# Patient Record
Sex: MALE | Race: WHITE | NOT HISPANIC OR LATINO | Employment: FULL TIME | URBAN - NONMETROPOLITAN AREA
[De-identification: names, ages, dates, MRNs, and addresses within clinical notes are randomized per-mention and may not be internally consistent; named-entity substitution may affect disease eponyms.]

---

## 2022-09-30 ENCOUNTER — APPOINTMENT (OUTPATIENT)
Dept: LAB | Facility: CLINIC | Age: 77
End: 2022-09-30
Payer: COMMERCIAL

## 2022-09-30 DIAGNOSIS — E78.5 HYPERLIPIDEMIA, UNSPECIFIED HYPERLIPIDEMIA TYPE: ICD-10-CM

## 2022-09-30 DIAGNOSIS — E13.69 OTHER SPECIFIED DIABETES MELLITUS WITH OTHER SPECIFIED COMPLICATION, UNSPECIFIED WHETHER LONG TERM INSULIN USE (HCC): ICD-10-CM

## 2022-09-30 DIAGNOSIS — E03.9 MYXEDEMA HEART DISEASE: ICD-10-CM

## 2022-09-30 DIAGNOSIS — N13.8 ENLARGED PROSTATE WITH URINARY OBSTRUCTION: ICD-10-CM

## 2022-09-30 DIAGNOSIS — I51.9 MYXEDEMA HEART DISEASE: ICD-10-CM

## 2022-09-30 DIAGNOSIS — N40.0 BENIGN PROSTATIC HYPERPLASIA, UNSPECIFIED WHETHER LOWER URINARY TRACT SYMPTOMS PRESENT: ICD-10-CM

## 2022-09-30 DIAGNOSIS — N40.1 ENLARGED PROSTATE WITH URINARY OBSTRUCTION: ICD-10-CM

## 2022-09-30 LAB
ANION GAP SERPL CALCULATED.3IONS-SCNC: 2 MMOL/L (ref 4–13)
BASOPHILS # BLD AUTO: 0.04 THOUSANDS/ΜL (ref 0–0.1)
BASOPHILS NFR BLD AUTO: 1 % (ref 0–1)
BUN SERPL-MCNC: 24 MG/DL (ref 5–25)
CALCIUM SERPL-MCNC: 9.5 MG/DL (ref 8.3–10.1)
CHLORIDE SERPL-SCNC: 108 MMOL/L (ref 96–108)
CO2 SERPL-SCNC: 29 MMOL/L (ref 21–32)
CREAT SERPL-MCNC: 0.93 MG/DL (ref 0.6–1.3)
EOSINOPHIL # BLD AUTO: 0.15 THOUSAND/ΜL (ref 0–0.61)
EOSINOPHIL NFR BLD AUTO: 4 % (ref 0–6)
ERYTHROCYTE [DISTWIDTH] IN BLOOD BY AUTOMATED COUNT: 18.2 % (ref 11.6–15.1)
GFR SERPL CREATININE-BSD FRML MDRD: 80 ML/MIN/1.73SQ M
GLUCOSE P FAST SERPL-MCNC: 92 MG/DL (ref 65–99)
HCT VFR BLD AUTO: 42.4 % (ref 36.5–49.3)
HGB BLD-MCNC: 13.2 G/DL (ref 12–17)
IMM GRANULOCYTES # BLD AUTO: 0.01 THOUSAND/UL (ref 0–0.2)
IMM GRANULOCYTES NFR BLD AUTO: 0 % (ref 0–2)
LYMPHOCYTES # BLD AUTO: 0.95 THOUSANDS/ΜL (ref 0.6–4.47)
LYMPHOCYTES NFR BLD AUTO: 24 % (ref 14–44)
MCH RBC QN AUTO: 27.1 PG (ref 26.8–34.3)
MCHC RBC AUTO-ENTMCNC: 31.1 G/DL (ref 31.4–37.4)
MCV RBC AUTO: 87 FL (ref 82–98)
MONOCYTES # BLD AUTO: 0.45 THOUSAND/ΜL (ref 0.17–1.22)
MONOCYTES NFR BLD AUTO: 11 % (ref 4–12)
NEUTROPHILS # BLD AUTO: 2.43 THOUSANDS/ΜL (ref 1.85–7.62)
NEUTS SEG NFR BLD AUTO: 60 % (ref 43–75)
NRBC BLD AUTO-RTO: 0 /100 WBCS
PLATELET # BLD AUTO: 221 THOUSANDS/UL (ref 149–390)
PMV BLD AUTO: 10.7 FL (ref 8.9–12.7)
POTASSIUM SERPL-SCNC: 4.4 MMOL/L (ref 3.5–5.3)
PSA SERPL-MCNC: <0.1 NG/ML (ref 0–4)
RBC # BLD AUTO: 4.87 MILLION/UL (ref 3.88–5.62)
SODIUM SERPL-SCNC: 139 MMOL/L (ref 135–147)
TSH SERPL DL<=0.05 MIU/L-ACNC: 2.59 UIU/ML (ref 0.45–4.5)
WBC # BLD AUTO: 4.03 THOUSAND/UL (ref 4.31–10.16)

## 2022-09-30 PROCEDURE — 85025 COMPLETE CBC W/AUTO DIFF WBC: CPT

## 2022-09-30 PROCEDURE — 80048 BASIC METABOLIC PNL TOTAL CA: CPT

## 2022-09-30 PROCEDURE — G0103 PSA SCREENING: HCPCS

## 2022-09-30 PROCEDURE — 36415 COLL VENOUS BLD VENIPUNCTURE: CPT

## 2022-09-30 PROCEDURE — 84443 ASSAY THYROID STIM HORMONE: CPT

## 2022-12-08 PROBLEM — E78.5 HYPERLIPIDEMIA: Status: ACTIVE | Noted: 2021-09-02

## 2022-12-08 PROBLEM — Z85.09 HISTORY OF GASTROINTESTINAL STROMAL TUMOR (GIST): Status: ACTIVE | Noted: 2021-09-02

## 2022-12-08 PROBLEM — E03.9 HYPOTHYROIDISM: Status: ACTIVE | Noted: 2021-09-02

## 2022-12-08 PROBLEM — K56.600 PARTIAL SMALL BOWEL OBSTRUCTION (HCC): Status: ACTIVE | Noted: 2021-09-02

## 2022-12-08 PROBLEM — C61 PROSTATE CANCER (HCC): Status: ACTIVE | Noted: 2022-01-07

## 2022-12-08 PROBLEM — E04.2 MULTINODULAR GOITER: Status: ACTIVE | Noted: 2022-12-08

## 2022-12-09 ENCOUNTER — OFFICE VISIT (OUTPATIENT)
Dept: FAMILY MEDICINE CLINIC | Facility: CLINIC | Age: 77
End: 2022-12-09

## 2022-12-09 VITALS
HEIGHT: 67 IN | OXYGEN SATURATION: 97 % | WEIGHT: 145.4 LBS | TEMPERATURE: 96.5 F | BODY MASS INDEX: 22.82 KG/M2 | SYSTOLIC BLOOD PRESSURE: 122 MMHG | DIASTOLIC BLOOD PRESSURE: 76 MMHG | HEART RATE: 60 BPM

## 2022-12-09 DIAGNOSIS — Z23 ENCOUNTER FOR VACCINATION: ICD-10-CM

## 2022-12-09 DIAGNOSIS — K59.09 CHRONIC CONSTIPATION: ICD-10-CM

## 2022-12-09 DIAGNOSIS — C49.A0 MALIGNANT GASTROINTESTINAL STROMAL TUMOR, UNSPECIFIED SITE (HCC): Primary | ICD-10-CM

## 2022-12-09 RX ORDER — UBIDECARENONE 100 MG
100 CAPSULE ORAL DAILY
COMMUNITY

## 2022-12-09 RX ORDER — ATORVASTATIN CALCIUM 10 MG/1
10 TABLET, FILM COATED ORAL DAILY
COMMUNITY
Start: 2022-12-02

## 2022-12-09 RX ORDER — BIOTIN 1 MG
2500 TABLET ORAL 3 TIMES WEEKLY
COMMUNITY

## 2022-12-09 RX ORDER — TADALAFIL 5 MG/1
5 TABLET ORAL DAILY
COMMUNITY
Start: 2022-08-09

## 2022-12-09 RX ORDER — ICOSAPENT ETHYL 1000 MG/1
1 CAPSULE ORAL 2 TIMES DAILY
COMMUNITY

## 2022-12-09 RX ORDER — LEVOTHYROXINE SODIUM 0.05 MG/1
50 TABLET ORAL DAILY
COMMUNITY
Start: 2022-12-02

## 2022-12-09 RX ORDER — TADALAFIL 20 MG/1
20 TABLET ORAL WEEKLY
COMMUNITY
Start: 2022-08-15

## 2022-12-09 RX ORDER — LANSOPRAZOLE 15 MG/1
15 CAPSULE, DELAYED RELEASE ORAL DAILY
COMMUNITY
Start: 2022-11-28

## 2022-12-09 NOTE — PROGRESS NOTES
Assessment/Plan:    78year old male with PMH of gastrointestinal stromal tumor s/p  partial gastrectomy (2008), HTN, HLD, episodes of partial SBO, and prostate cancer s/p prostatectomy (1/10/1763) complicated by SBO s/p small bowel resection and reanastomosis, DVT of the right proximal and mid posterior tibialis veins (5/2022) on Xarelto  Patient with c/o intermittent episodes of constipation, only has BM every 3-4x since his operation on 3/23/22 typically will resolve with senna + milk of magnesia  Recent labs reviewed 9/30/22 showed TSH, CBC and BMP wnl  PSA total also wnl  Patient encouraged to continue follow up with Urology  He is requesting referral to GI which was placed today  No problem-specific Assessment & Plan notes found for this encounter  Diagnoses and all orders for this visit:    History of Malignant gastrointestinal stromal tumor s/p gastrectomy  -     Ambulatory Referral to Gastroenterology; Future    Encounter for vaccination  -     influenza vaccine, high-dose, PF 0 7 mL (FLUZONE HIGH-DOSE)    Chronic constipation  -  See a/p as above      - Ambulatory Referral to Gastroenterology; Future      Subjective:      Patient ID: Debra Barlow is a 78 y o  male  78year old male with PMH of gastrointestinal stromal tumor s/p  partial gastrectomy (2008), HTN, HLD, episodes of partial SBO, and prostate cancer s/p prostatectomy (3/23/2022), DVT of the right proximal and mid posterior tibialis veins (5/2022) on Xarelto  Of note, patient's prostatectomy and pelvic lymph node dissection was complicated by small bowel injury  Then unfortunately, post op surgical course was complicated by high grade SBO and patient had to undergo open lysis of adhesions with small bowel resection and reanastomosis    Patient reports that since his surgery he has been experiencing intermittent episodes of constipation, only has BM every 3-4x  Patient reports he take senna but sometimes doesn't experience relief of constipation and subsequently takes milk of magnesia which will typically resolves his constipation  Patient also reports intermittent abdominal "twinges" not true pain on his right lower quadrant  Patient reports he started eating soft foods at that time, prune juice every morning  Oatmeal fruits for breakfast, vegetables and lean protein (ie fish) for dinner and very light dinner typically around 3:30/4pm  Patient denies any BRBPR, melanotic stools, dysphagia with liquids  He does, however, not that if he eats thick solids he will develop a sensation of "food being stuck in there"  Patient baseline weight he reports is about 152-154lbs, currently at 145 lbs so does have some weight loss  He is active, however, and notes that he walks on his treadmill everyday        The following portions of the patient's history were reviewed and updated as appropriate: allergies, current medications, past family history, past medical history, past social history, past surgical history and problem list     Review of Systems   Constitutional: Negative for chills and fever  Respiratory: Negative for chest tightness and shortness of breath  Gastrointestinal: Positive for constipation  Negative for abdominal distention, abdominal pain, blood in stool, nausea and vomiting  Objective:      /76   Pulse 60   Temp (!) 96 5 °F (35 8 °C) (Tympanic)   Ht 5' 6 5" (1 689 m)   Wt 66 kg (145 lb 6 4 oz)   SpO2 97%   BMI 23 12 kg/m²          Physical Exam  Constitutional:       Appearance: He is well-developed  HENT:      Head: Normocephalic and atraumatic  Nose: Nose normal    Eyes:      Conjunctiva/sclera: Conjunctivae normal    Cardiovascular:      Rate and Rhythm: Normal rate and regular rhythm  Heart sounds: Normal heart sounds  Pulmonary:      Effort: Pulmonary effort is normal       Breath sounds: Normal breath sounds     Abdominal:      General: Bowel sounds are normal       Palpations: Abdomen is soft    Musculoskeletal:        Arms:       Cervical back: Normal range of motion and neck supple  Right lower leg: No edema  Left lower leg: No edema  Comments: 3cm x 3cm lipoma on medial portion of deltoid   Skin:     General: Skin is warm and dry  Neurological:      Mental Status: He is alert and oriented to person, place, and time

## 2022-12-28 DIAGNOSIS — K21.9 GASTROESOPHAGEAL REFLUX DISEASE WITHOUT ESOPHAGITIS: Primary | ICD-10-CM

## 2022-12-29 RX ORDER — LANSOPRAZOLE 15 MG/1
15 CAPSULE, DELAYED RELEASE ORAL DAILY
Qty: 30 CAPSULE | Refills: 1 | Status: SHIPPED | OUTPATIENT
Start: 2022-12-29 | End: 2023-01-05 | Stop reason: CLARIF

## 2023-01-03 ENCOUNTER — TELEPHONE (OUTPATIENT)
Dept: FAMILY MEDICINE CLINIC | Facility: CLINIC | Age: 78
End: 2023-01-03

## 2023-01-03 NOTE — TELEPHONE ENCOUNTER
Called patient and left general VM in regards to medication refill of Prevacid  Coverage has changed and patient will have to pay out of pocket for medication or Dr Sreekanth Sesay would have to speak with patient about switching medication  Asked to give patient a call back to the office

## 2023-01-05 DIAGNOSIS — K21.9 GASTROESOPHAGEAL REFLUX DISEASE WITHOUT ESOPHAGITIS: ICD-10-CM

## 2023-01-05 RX ORDER — OMEPRAZOLE 10 MG/1
10 CAPSULE, DELAYED RELEASE ORAL DAILY
Qty: 90 CAPSULE | Refills: 1 | Status: SHIPPED | OUTPATIENT
Start: 2023-01-05

## 2023-01-05 RX ORDER — LANSOPRAZOLE 15 MG/1
10 CAPSULE, DELAYED RELEASE ORAL DAILY
COMMUNITY
End: 2023-01-05 | Stop reason: CLARIF

## 2023-01-05 NOTE — PROGRESS NOTES
PCP discussed with patient that insurance no longer covering lansoprazole, hence he is amenable to changing PPI to covered alternative  Will send over Prilosec 10mg at this time  Patient to call back with any questions or concerns

## 2023-01-06 ENCOUNTER — RA CDI HCC (OUTPATIENT)
Dept: OTHER | Facility: HOSPITAL | Age: 78
End: 2023-01-06

## 2023-01-06 NOTE — PROGRESS NOTES
[facility]  coding opportunities       Chart reviewed, no opportunity found:   [address]        Patients Insurance     Medicare Insurance: [employer]

## 2023-01-12 ENCOUNTER — TELEPHONE (OUTPATIENT)
Dept: FAMILY MEDICINE CLINIC | Facility: CLINIC | Age: 78
End: 2023-01-12

## 2023-01-12 NOTE — TELEPHONE ENCOUNTER
Anali Beatty called to reschedule his AWV to next week due to transportation issues  His appointment has been rescheduled to 1/19 with Dr Serena Diaz  I did mention that his Prevacid medication is not covered under his new insurance  I informed him that  wanted to review other medication options that could be covered under his insurance  He agreed to cover this concern during his upcoming appointment  Lastly, I mentioned that we have received conflicting dates for the medical records requests we'd like to send to [facility] and [facility]  I asked if he could kindly review his information and provide the accurate dates during his upcoming appointment

## 2023-01-19 ENCOUNTER — APPOINTMENT (OUTPATIENT)
Dept: LAB | Facility: CLINIC | Age: 78
End: 2023-01-19

## 2023-01-19 ENCOUNTER — OFFICE VISIT (OUTPATIENT)
Dept: FAMILY MEDICINE CLINIC | Facility: CLINIC | Age: 78
End: 2023-01-19

## 2023-01-19 ENCOUNTER — TELEPHONE (OUTPATIENT)
Dept: FAMILY MEDICINE CLINIC | Facility: CLINIC | Age: 78
End: 2023-01-19

## 2023-01-19 VITALS
WEIGHT: 148.6 LBS | BODY MASS INDEX: 23.32 KG/M2 | DIASTOLIC BLOOD PRESSURE: 70 MMHG | OXYGEN SATURATION: 100 % | HEIGHT: 67 IN | TEMPERATURE: 96.7 F | SYSTOLIC BLOOD PRESSURE: 124 MMHG

## 2023-01-19 DIAGNOSIS — Z11.59 NEED FOR HEPATITIS C SCREENING TEST: ICD-10-CM

## 2023-01-19 DIAGNOSIS — Z23 ENCOUNTER FOR VACCINATION: ICD-10-CM

## 2023-01-19 DIAGNOSIS — K21.9 GASTROESOPHAGEAL REFLUX DISEASE WITHOUT ESOPHAGITIS: ICD-10-CM

## 2023-01-19 DIAGNOSIS — Z00.00 MEDICARE ANNUAL WELLNESS VISIT, SUBSEQUENT: Primary | ICD-10-CM

## 2023-01-19 DIAGNOSIS — E78.2 MIXED HYPERLIPIDEMIA: ICD-10-CM

## 2023-01-19 LAB — HCV AB SER QL: NORMAL

## 2023-01-19 RX ORDER — ICOSAPENT ETHYL 1000 MG/1
1 CAPSULE ORAL 2 TIMES DAILY
Qty: 90 CAPSULE | Refills: 1 | Status: SHIPPED | OUTPATIENT
Start: 2023-01-19

## 2023-01-19 RX ORDER — OMEPRAZOLE 10 MG/1
10 CAPSULE, DELAYED RELEASE ORAL DAILY
Qty: 90 CAPSULE | Refills: 1 | Status: SHIPPED | OUTPATIENT
Start: 2023-01-19

## 2023-01-19 RX ORDER — SILDENAFIL 25 MG/1
25 TABLET, FILM COATED ORAL DAILY PRN
Qty: 10 TABLET | Refills: 0 | Status: CANCELLED | OUTPATIENT
Start: 2023-01-19

## 2023-01-19 NOTE — TELEPHONE ENCOUNTER
Called patient about lab results  Left  to give the office a call       Hepatitis C lab result was normal

## 2023-01-19 NOTE — PROGRESS NOTES
Assessment and Plan:     Pt reports getting a colonoscopy done in 2020 at [address]  We are unable to locate the results  Patient is unsure if he needs a repeat colonoscopy  He states that he will be following up with [address] in 4 months and will review colonoscopy with his provider there  He has a PMH of ED and reports dose increase of Tadafil by his urologist  He reports no improvement with Tadafil  PCP and Urologist to have a consultation and discuss further pharmacotherapy  Problem List Items Addressed This Visit        Digestive    GERD (gastroesophageal reflux disease)     Medication changed to omeprazole as lansoprazole is not covered by his insurance  Relevant Medications    omeprazole (PriLOSEC) 10 mg delayed release capsule       Other    Hyperlipidemia    Relevant Medications    Icosapent Ethyl 1 g CAPS   Other Visit Diagnoses     Medicare annual wellness visit, subsequent    -  Primary    Encounter for vaccination        Relevant Orders    Pneumococcal Conjugate Vaccine 20-valent (Pcv20) (Completed)    Need for hepatitis C screening test        Relevant Orders    Hepatitis C antibody (Completed)          Depression Screening and Follow-up Plan: Patient was screened for depression during today's encounter  They screened negative with a PHQ-2 score of 0  Preventive health issues were discussed with patient, and age appropriate screening tests were ordered as noted in patient's After Visit Summary  Personalized health advice and appropriate referrals for health education or preventive services given if needed, as noted in patient's After Visit Summary  History of Present Illness:     Patient presents for a Medicare Wellness Visit    Patinet states that he is doing well overall  Reports no new concerns        Patient Care Team:  Corina Johnson MD as PCP - General (Family Medicine)     Review of Systems:     Review of Systems   Constitutional: Negative for chills and fever  HENT: Negative for ear pain and sore throat  Eyes: Negative for pain and visual disturbance  Respiratory: Negative for cough and shortness of breath  Cardiovascular: Negative for chest pain and palpitations  Gastrointestinal: Negative for abdominal pain and vomiting  Genitourinary: Negative for dysuria and hematuria  Musculoskeletal: Negative for arthralgias  Skin: Negative for color change and rash  Neurological: Negative for seizures and syncope  All other systems reviewed and are negative         Problem List:     Patient Active Problem List   Diagnosis   • GERD (gastroesophageal reflux disease)   • History of gastrointestinal stromal tumor (GIST)   • Hyperlipidemia   • Hypothyroidism   • Partial small bowel obstruction (HCC)   • Prostate cancer ([facility] [address] )   • Multinodular goiter   • Malignant gastrointestinal stromal tumor, unspecified site [facility])      Past Medical and Surgical History:     Past Medical History:   Diagnosis Date   • Allergic      Past Surgical History:   Procedure Laterality Date   • PROSTATE SURGERY  2022   • SMALL INTESTINE SURGERY N/A    • STOMACH SURGERY  1998      Family History:     Family History   Problem Relation Age of Onset   • Heart attack Mother    • Heart attack Father    • Hypertension Father    • Heart attack Sister    • Heart attack Brother       Social History:     Social History     Socioeconomic History   • Marital status: Single     Spouse name: None   • Number of children: None   • Years of education: None   • Highest education level: None   Occupational History   • None   Tobacco Use   • Smoking status: Never   • Smokeless tobacco: Never   Vaping Use   • Vaping Use: Never used   Substance and Sexual Activity   • Alcohol use: Not Currently     Alcohol/week: 1 0 standard drink     Types: 1 Glasses of wine per week     Comment: occassional   • Drug use: Never   • Sexual activity: None   Other Topics Concern   • None   Social History Narrative   • None     Social Determinants of Health     Financial Resource Strain: Low Risk    • Difficulty of Paying Living Expenses: Not hard at all   Food Insecurity: Not on file   Transportation Needs: No Transportation Needs   • Lack of Transportation (Medical): No   • Lack of Transportation (Non-Medical): No   Physical Activity: Not on file   Stress: Not on file   Social Connections: Not on file   Intimate Partner Violence: Not on file   Housing Stability: Not on file      Medications and Allergies:     Current Outpatient Medications   Medication Sig Dispense Refill   • ARTIFICIAL TEAR SOLUTION OP Apply 2 drops to eye daily     • atorvastatin (LIPITOR) 10 mg tablet Take 10 mg by mouth daily     • Biotin 1000 MCG tablet Take 2,500 mcg by mouth 3 (three) times a week     • co-enzyme Q-10 100 mg capsule Take 100 mg by mouth in the morning     • Icosapent Ethyl 1 g CAPS Take 1 capsule (1 g total) by mouth 2 (two) times a day 90 capsule 1   • levothyroxine 50 mcg tablet Take 50 mcg by mouth daily     • omeprazole (PriLOSEC) 10 mg delayed release capsule Take 1 capsule (10 mg total) by mouth daily 90 capsule 1   • tadalafil (CIALIS) 20 MG tablet Take 20 mg by mouth once a week (Patient not taking: Reported on 1/19/2023)     • tadalafil (CIALIS) 5 MG tablet Take 5 mg by mouth in the morning (Patient not taking: Reported on 1/19/2023)       No current facility-administered medications for this visit  Allergies   Allergen Reactions   • Iodine - Food Allergy Hives, Anaphylaxis and Rash     CT dye  CT dye     • Nuts - Food Allergy Itching     Tongue irritation and pruritis    Itching of lips and tongue     • Sulfa Antibiotics Hives and Rash   • Codeine Abdominal Pain and Other (See Comments)     Stomach pain     • Shellfish Allergy - Food Allergy Rash      Immunizations:     Immunization History   Administered Date(s) Administered   • COVID-19 MODERNA VACC 0 5 ML IM 04/02/2021, 04/30/2021, 11/01/2021   • Influenza, high dose seasonal 0 7 mL 12/09/2022   • Pneumococcal Conjugate Vaccine 20-valent (Pcv20), Polysace 01/19/2023      Health Maintenance:         Topic Date Due   • Colorectal Cancer Screening  Never done   • Hepatitis C Screening  Completed         Topic Date Due   • COVID-19 Vaccine (4 - Booster for Moderna series) 12/27/2021      Medicare Screening Tests and Risk Assessments:     John Wood is here for his Subsequent Wellness visit  Last Medicare Wellness visit information reviewed, patient interviewed, no change since last AWV  Health Risk Assessment:   Patient rates overall health as excellent  Patient feels that their physical health rating is same  Patient is very satisfied with their life  Eyesight was rated as same  Hearing was rated as same  Patient feels that their emotional and mental health rating is same  Patients states they are never, rarely angry  Patient states they are sometimes unusually tired/fatigued  Pain experienced in the last 7 days has been none  Patient states that he has experienced no weight loss or gain in last 6 months  Depression Screening:   PHQ-2 Score: 0      Fall Risk Screening: In the past year, patient has experienced: no history of falling in past year      Home Safety:  Patient does not have trouble with stairs inside or outside of their home  Patient has working smoke alarms and has working carbon monoxide detector  Home safety hazards include: none  Nutrition:   Current diet is No Added Salt, Regular, Low Cholesterol and Low Saturated Fat  Medications:   Patient is currently taking over-the-counter supplements  OTC medications include: see medication list  Patient is able to manage medications  Activities of Daily Living (ADLs)/Instrumental Activities of Daily Living (IADLs):   Walk and transfer into and out of bed and chair?: Yes  Dress and groom yourself?: Yes    Bathe or shower yourself?: Yes    Feed yourself?  Yes  Do your laundry/housekeeping?: Yes  Manage your money, pay your bills and track your expenses?: Yes  Make your own meals?: Yes    Do your own shopping?: Yes    Previous Hospitalizations:   Any hospitalizations or ED visits within the last 12 months?: No      Advance Care Planning:   Living will: Yes    Durable POA for healthcare: Yes    Advanced directive: Yes    Advanced directive counseling given: Yes    Five wishes given: No      Comments: Girlfriend- Dandre Apo  Code status- full code    PREVENTIVE SCREENINGS      Cardiovascular Screening:    General: Screening Not Indicated, History Lipid Disorder and Risks and Benefits Discussed      Diabetes Screening:     General: Screening Not Indicated, History Diabetes and Risks and Benefits Discussed      Colorectal Cancer Screening:     General: Risks and Benefits Discussed      Prostate Cancer Screening:    General: History Prostate Cancer and Screening Current      Abdominal Aortic Aneurysm (AAA) Screening:    Risk factors include: age between 73-69 yo        General: Screening Not Indicated      Lung Cancer Screening:     General: Screening Not Indicated      Hepatitis C Screening:    General: Risks and Benefits Discussed    Hep C Screening Accepted: Yes      Screening, Brief Intervention, and Referral to Treatment (SBIRT)    Screening  Typical number of drinks in a day: 0  Typical number of drinks in a week: 0  Interpretation: Low risk drinking behavior  Single Item Drug Screening:  How often have you used an illegal drug (including marijuana) or a prescription medication for non-medical reasons in the past year? never    Single Item Drug Screen Score: 0  Interpretation: Negative screen for possible drug use disorder    No results found  Physical Exam:     /70 (BP Location: Right arm)   Temp (!) 96 7 °F (35 9 °C) (Tympanic)   Ht 5' 6 5" (1 689 m)   Wt 67 4 kg (148 lb 9 6 oz)   SpO2 100%   BMI 23 63 kg/m²     Physical Exam  Vitals and nursing note reviewed     Constitutional: General: He is not in acute distress  Appearance: He is well-developed  HENT:      Head: Normocephalic and atraumatic  Eyes:      Conjunctiva/sclera: Conjunctivae normal    Cardiovascular:      Rate and Rhythm: Normal rate and regular rhythm  Heart sounds: No murmur heard  Pulmonary:      Effort: Pulmonary effort is normal  No respiratory distress  Breath sounds: Normal breath sounds  Abdominal:      Palpations: Abdomen is soft  Tenderness: There is no abdominal tenderness  Musculoskeletal:         General: No swelling  Cervical back: Neck supple  Skin:     General: Skin is warm and dry  Capillary Refill: Capillary refill takes less than 2 seconds  Neurological:      Mental Status: He is alert     Psychiatric:         Mood and Affect: Mood normal           Bhavin Holman MD

## 2023-01-19 NOTE — PATIENT INSTRUCTIONS
Medicare Preventive Visit Patient Instructions  Thank you for completing your Welcome to Medicare Visit or Medicare Annual Wellness Visit today  Your next wellness visit will be due in one year (1/20/2024)  The screening/preventive services that you may require over the next 5-10 years are detailed below  Some tests may not apply to you based off risk factors and/or age  Screening tests ordered at today's visit but not completed yet may show as past due  Also, please note that scanned in results may not display below  Preventive Screenings:  Service Recommendations Previous Testing/Comments   Colorectal Cancer Screening  · Colonoscopy    · Fecal Occult Blood Test (FOBT)/Fecal Immunochemical Test (FIT)  · Fecal DNA/Cologuard Test  · Flexible Sigmoidoscopy Age: 39-70 years old   Colonoscopy: every 10 years (May be performed more frequently if at higher risk)  OR  FOBT/FIT: every 1 year  OR  Cologuard: every 3 years  OR  Sigmoidoscopy: every 5 years  Screening may be recommended earlier than age 39 if at higher risk for colorectal cancer  Also, an individualized decision between you and your healthcare provider will decide whether screening between the ages of 74-80 would be appropriate   Colonoscopy: 06/02/2020  FOBT/FIT: Not on file  Cologuard: Not on file  Sigmoidoscopy: Not on file          Prostate Cancer Screening Individualized decision between patient and health care provider in men between ages of 53-78   Medicare will cover every 12 months beginning on the day after your 50th birthday PSA: <0 1 ng/mL     History Prostate Cancer  Screening Not Indicated     Hepatitis C Screening Once for adults born between 1945 and 1965  More frequently in patients at high risk for Hepatitis C Hep C Antibody: Not on file        Diabetes Screening 1-2 times per year if you're at risk for diabetes or have pre-diabetes Fasting glucose: 92 mg/dL (9/30/2022)  A1C: No results in last 5 years (No results in last 5 years)  Screening Not Indicated  History Diabetes   Cholesterol Screening Once every 5 years if you don't have a lipid disorder  May order more often based on risk factors  Lipid panel: 04/02/2021  Screening Not Indicated  History Lipid Disorder      Other Preventive Screenings Covered by Medicare:  1  Abdominal Aortic Aneurysm (AAA) Screening: covered once if your at risk  You're considered to be at risk if you have a family history of AAA or a male between the age of 73-68 who smoking at least 100 cigarettes in your lifetime  2  Lung Cancer Screening: covers low dose CT scan once per year if you meet all of the following conditions: (1) Age 50-69; (2) No signs or symptoms of lung cancer; (3) Current smoker or have quit smoking within the last 15 years; (4) You have a tobacco smoking history of at least 20 pack years (packs per day x number of years you smoked); (5) You get a written order from a healthcare provider  3  Glaucoma Screening: covered annually if you're considered high risk: (1) You have diabetes OR (2) Family history of glaucoma OR (3)  aged 48 and older OR (3)  American aged 72 and older  3  Osteoporosis Screening: covered every 2 years if you meet one of the following conditions: (1) Have a vertebral abnormality; (2) On glucocorticoid therapy for more than 3 months; (3) Have primary hyperparathyroidism; (4) On osteoporosis medications and need to assess response to drug therapy  5  HIV Screening: covered annually if you're between the age of 12-76  Also covered annually if you are younger than 13 and older than 72 with risk factors for HIV infection  For pregnant patients, it is covered up to 3 times per pregnancy      Immunizations:  Immunization Recommendations   Influenza Vaccine Annual influenza vaccination during flu season is recommended for all persons aged >= 6 months who do not have contraindications   Pneumococcal Vaccine   * Pneumococcal conjugate vaccine = PCV13 (Prevnar 13), PCV15 (Vaxneuvance), PCV20 (Prevnar 20)  * Pneumococcal polysaccharide vaccine = PPSV23 (Pneumovax) Adults 2364 years old: 1-3 doses may be recommended based on certain risk factors  Adults 72 years old: 1-2 doses may be recommended based off what pneumonia vaccine you previously received   Hepatitis B Vaccine 3 dose series if at intermediate or high risk (ex: diabetes, end stage renal disease, liver disease)   Tetanus (Td) Vaccine - COST NOT COVERED BY MEDICARE PART B Following completion of primary series, a booster dose should be given every 10 years to maintain immunity against tetanus  Td may also be given as tetanus wound prophylaxis  Tdap Vaccine - COST NOT COVERED BY MEDICARE PART B Recommended at least once for all adults  For pregnant patients, recommended with each pregnancy  Shingles Vaccine (Shingrix) - COST NOT COVERED BY MEDICARE PART B  2 shot series recommended in those aged 48 and above     Health Maintenance Due:      Topic Date Due   • Hepatitis C Screening  Never done   • Colorectal Cancer Screening  Never done     Immunizations Due:      Topic Date Due   • Pneumococcal Vaccine: 65+ Years (1 - PCV) Never done   • COVID-19 Vaccine (4 - Booster for Tiffani Mace series) 12/27/2021     Advance Directives   What are advance directives? Advance directives are legal documents that state your wishes and plans for medical care  These plans are made ahead of time in case you lose your ability to make decisions for yourself  Advance directives can apply to any medical decision, such as the treatments you want, and if you want to donate organs  What are the types of advance directives? There are many types of advance directives, and each state has rules about how to use them  You may choose a combination of any of the following:  · Living will: This is a written record of the treatment you want   You can also choose which treatments you do not want, which to limit, and which to stop at a certain time  This includes surgery, medicine, IV fluid, and tube feedings  · Durable power of  for healthcare [facility]): This is a written record that states who you want to make healthcare choices for you when you are unable to make them for yourself  This person, called a proxy, is usually a family member or a friend  You may choose more than 1 proxy  · Do not resuscitate (DNR) order:  A DNR order is used in case your heart stops beating or you stop breathing  It is a request not to have certain forms of treatment, such as CPR  A DNR order may be included in other types of advance directives  · Medical directive: This covers the care that you want if you are in a coma, near death, or unable to make decisions for yourself  You can list the treatments you want for each condition  Treatment may include pain medicine, surgery, blood transfusions, dialysis, IV or tube feedings, and a ventilator (breathing machine)  · Values history: This document has questions about your views, beliefs, and how you feel and think about life  This information can help others choose the care that you would choose  Why are advance directives important? An advance directive helps you control your care  Although spoken wishes may be used, it is better to have your wishes written down  Spoken wishes can be misunderstood, or not followed  Treatments may be given even if you do not want them  An advance directive may make it easier for your family to make difficult choices about your care  © Copyright [employer] 2018 Information is for End User's use only and may not be sold, redistributed or otherwise used for commercial purposes  All illustrations and images included in CareNotes® are the copyrighted property of [employer]  or [facility] Preventive Visit Patient Instructions  Thank you for completing your Welcome to Medicare Visit or Medicare Annual Wellness Visit today   Your next wellness visit will be due in one year (1/20/2024)  The screening/preventive services that you may require over the next 5-10 years are detailed below  Some tests may not apply to you based off risk factors and/or age  Screening tests ordered at today's visit but not completed yet may show as past due  Also, please note that scanned in results may not display below  Preventive Screenings:  Service Recommendations Previous Testing/Comments   Colorectal Cancer Screening  · Colonoscopy    · Fecal Occult Blood Test (FOBT)/Fecal Immunochemical Test (FIT)  · Fecal DNA/Cologuard Test  · Flexible Sigmoidoscopy Age: 39-70 years old   Colonoscopy: every 10 years (May be performed more frequently if at higher risk)  OR  FOBT/FIT: every 1 year  OR  Cologuard: every 3 years  OR  Sigmoidoscopy: every 5 years  Screening may be recommended earlier than age 39 if at higher risk for colorectal cancer  Also, an individualized decision between you and your healthcare provider will decide whether screening between the ages of 74-80 would be appropriate  Colonoscopy: 06/02/2020  FOBT/FIT: Not on file  Cologuard: Not on file  Sigmoidoscopy: Not on file          Prostate Cancer Screening Individualized decision between patient and health care provider in men between ages of 53-78   Medicare will cover every 12 months beginning on the day after your 50th birthday PSA: <0 1 ng/mL     History Prostate Cancer  Screening Current     Hepatitis C Screening Once for adults born between 1945 and 1965  More frequently in patients at high risk for Hepatitis C Hep C Antibody: Not on file        Diabetes Screening 1-2 times per year if you're at risk for diabetes or have pre-diabetes Fasting glucose: 92 mg/dL (9/30/2022)  A1C: No results in last 5 years (No results in last 5 years)  Screening Not Indicated  History Diabetes   Cholesterol Screening Once every 5 years if you don't have a lipid disorder  May order more often based on risk factors   Lipid panel: 04/02/2021  Screening Not Indicated  History Lipid Disorder      Other Preventive Screenings Covered by Medicare:  6  Abdominal Aortic Aneurysm (AAA) Screening: covered once if your at risk  You're considered to be at risk if you have a family history of AAA or a male between the age of 73-68 who smoking at least 100 cigarettes in your lifetime  7  Lung Cancer Screening: covers low dose CT scan once per year if you meet all of the following conditions: (1) Age 50-69; (2) No signs or symptoms of lung cancer; (3) Current smoker or have quit smoking within the last 15 years; (4) You have a tobacco smoking history of at least 20 pack years (packs per day x number of years you smoked); (5) You get a written order from a healthcare provider  8  Glaucoma Screening: covered annually if you're considered high risk: (1) You have diabetes OR (2) Family history of glaucoma OR (3)  aged 48 and older OR (3)  American aged 72 and older  5  Osteoporosis Screening: covered every 2 years if you meet one of the following conditions: (1) Have a vertebral abnormality; (2) On glucocorticoid therapy for more than 3 months; (3) Have primary hyperparathyroidism; (4) On osteoporosis medications and need to assess response to drug therapy  10  HIV Screening: covered annually if you're between the age of 12-76  Also covered annually if you are younger than 13 and older than 72 with risk factors for HIV infection  For pregnant patients, it is covered up to 3 times per pregnancy      Immunizations:  Immunization Recommendations   Influenza Vaccine Annual influenza vaccination during flu season is recommended for all persons aged >= 6 months who do not have contraindications   Pneumococcal Vaccine   * Pneumococcal conjugate vaccine = PCV13 (Prevnar 13), PCV15 (Vaxneuvance), PCV20 (Prevnar 20)  * Pneumococcal polysaccharide vaccine = PPSV23 (Pneumovax) Adults 25-60 years old: 1-3 doses may be recommended based on certain risk factors  Adults 72 years old: 1-2 doses may be recommended based off what pneumonia vaccine you previously received   Hepatitis B Vaccine 3 dose series if at intermediate or high risk (ex: diabetes, end stage renal disease, liver disease)   Tetanus (Td) Vaccine - COST NOT COVERED BY MEDICARE PART B Following completion of primary series, a booster dose should be given every 10 years to maintain immunity against tetanus  Td may also be given as tetanus wound prophylaxis  Tdap Vaccine - COST NOT COVERED BY MEDICARE PART B Recommended at least once for all adults  For pregnant patients, recommended with each pregnancy  Shingles Vaccine (Shingrix) - COST NOT COVERED BY MEDICARE PART B  2 shot series recommended in those aged 48 and above     Health Maintenance Due:      Topic Date Due   • Hepatitis C Screening  Never done   • Colorectal Cancer Screening  Never done     Immunizations Due:      Topic Date Due   • Pneumococcal Vaccine: 65+ Years (1 - PCV) Never done   • COVID-19 Vaccine (4 - Booster for Hardy Reap series) 12/27/2021     Advance Directives   What are advance directives? Advance directives are legal documents that state your wishes and plans for medical care  These plans are made ahead of time in case you lose your ability to make decisions for yourself  Advance directives can apply to any medical decision, such as the treatments you want, and if you want to donate organs  What are the types of advance directives? There are many types of advance directives, and each state has rules about how to use them  You may choose a combination of any of the following:  · Living will: This is a written record of the treatment you want  You can also choose which treatments you do not want, which to limit, and which to stop at a certain time  This includes surgery, medicine, IV fluid, and tube feedings  · Durable power of  for healthcare [facility]):   This is a written record that states who you want to make healthcare choices for you when you are unable to make them for yourself  This person, called a proxy, is usually a family member or a friend  You may choose more than 1 proxy  · Do not resuscitate (DNR) order:  A DNR order is used in case your heart stops beating or you stop breathing  It is a request not to have certain forms of treatment, such as CPR  A DNR order may be included in other types of advance directives  · Medical directive: This covers the care that you want if you are in a coma, near death, or unable to make decisions for yourself  You can list the treatments you want for each condition  Treatment may include pain medicine, surgery, blood transfusions, dialysis, IV or tube feedings, and a ventilator (breathing machine)  · Values history: This document has questions about your views, beliefs, and how you feel and think about life  This information can help others choose the care that you would choose  Why are advance directives important? An advance directive helps you control your care  Although spoken wishes may be used, it is better to have your wishes written down  Spoken wishes can be misunderstood, or not followed  Treatments may be given even if you do not want them  An advance directive may make it easier for your family to make difficult choices about your care  © Copyright [employer] 2018 Information is for End User's use only and may not be sold, redistributed or otherwise used for commercial purposes  All illustrations and images included in CareNotes® are the copyrighted property of [employer]  or [facility] Preventive Visit Patient Instructions  Thank you for completing your Welcome to Medicare Visit or Medicare Annual Wellness Visit today  Your next wellness visit will be due in one year (1/20/2024)  The screening/preventive services that you may require over the next 5-10 years are detailed below   Some tests may not apply to you based off risk factors and/or age  Screening tests ordered at today's visit but not completed yet may show as past due  Also, please note that scanned in results may not display below  Preventive Screenings:  Service Recommendations Previous Testing/Comments   Colorectal Cancer Screening  · Colonoscopy    · Fecal Occult Blood Test (FOBT)/Fecal Immunochemical Test (FIT)  · Fecal DNA/Cologuard Test  · Flexible Sigmoidoscopy Age: 39-70 years old   Colonoscopy: every 10 years (May be performed more frequently if at higher risk)  OR  FOBT/FIT: every 1 year  OR  Cologuard: every 3 years  OR  Sigmoidoscopy: every 5 years  Screening may be recommended earlier than age 39 if at higher risk for colorectal cancer  Also, an individualized decision between you and your healthcare provider will decide whether screening between the ages of 74-80 would be appropriate  Colonoscopy: 06/02/2020  FOBT/FIT: Not on file  Cologuard: Not on file  Sigmoidoscopy: Not on file    Risks and Benefits Discussed     Prostate Cancer Screening Individualized decision between patient and health care provider in men between ages of 53-78   Medicare will cover every 12 months beginning on the day after your 50th birthday PSA: <0 1 ng/mL     History Prostate Cancer  Screening Current     Hepatitis C Screening Once for adults born between 1945 and 1965  More frequently in patients at high risk for Hepatitis C Hep C Antibody: Not on file    Risks and Benefits Discussed  Due for Hepatitis C screening   Diabetes Screening 1-2 times per year if you're at risk for diabetes or have pre-diabetes Fasting glucose: 92 mg/dL (9/30/2022)  A1C: No results in last 5 years (No results in last 5 years)  Screening Not Indicated  History Diabetes  Risks and Benefits Discussed   Cholesterol Screening Once every 5 years if you don't have a lipid disorder  May order more often based on risk factors   Lipid panel: 04/02/2021  Screening Not Indicated  History Lipid Disorder  Risks and Benefits Discussed      Other Preventive Screenings Covered by Medicare:  11  Abdominal Aortic Aneurysm (AAA) Screening: covered once if your at risk  You're considered to be at risk if you have a family history of AAA or a male between the age of 73-68 who smoking at least 100 cigarettes in your lifetime  12  Lung Cancer Screening: covers low dose CT scan once per year if you meet all of the following conditions: (1) Age 50-69; (2) No signs or symptoms of lung cancer; (3) Current smoker or have quit smoking within the last 15 years; (4) You have a tobacco smoking history of at least 20 pack years (packs per day x number of years you smoked); (5) You get a written order from a healthcare provider  15  Glaucoma Screening: covered annually if you're considered high risk: (1) You have diabetes OR (2) Family history of glaucoma OR (3)  aged 48 and older OR (3)  American aged 72 and older  15  Osteoporosis Screening: covered every 2 years if you meet one of the following conditions: (1) Have a vertebral abnormality; (2) On glucocorticoid therapy for more than 3 months; (3) Have primary hyperparathyroidism; (4) On osteoporosis medications and need to assess response to drug therapy  15  HIV Screening: covered annually if you're between the age of 12-76  Also covered annually if you are younger than 13 and older than 72 with risk factors for HIV infection  For pregnant patients, it is covered up to 3 times per pregnancy      Immunizations:  Immunization Recommendations   Influenza Vaccine Annual influenza vaccination during flu season is recommended for all persons aged >= 6 months who do not have contraindications   Pneumococcal Vaccine   * Pneumococcal conjugate vaccine = PCV13 (Prevnar 13), PCV15 (Vaxneuvance), PCV20 (Prevnar 20)  * Pneumococcal polysaccharide vaccine = PPSV23 (Pneumovax) Adults 25-60 years old: 1-3 doses may be recommended based on certain risk factors  Adults 72 years old: 1-2 doses may be recommended based off what pneumonia vaccine you previously received   Hepatitis B Vaccine 3 dose series if at intermediate or high risk (ex: diabetes, end stage renal disease, liver disease)   Tetanus (Td) Vaccine - COST NOT COVERED BY MEDICARE PART B Following completion of primary series, a booster dose should be given every 10 years to maintain immunity against tetanus  Td may also be given as tetanus wound prophylaxis  Tdap Vaccine - COST NOT COVERED BY MEDICARE PART B Recommended at least once for all adults  For pregnant patients, recommended with each pregnancy  Shingles Vaccine (Shingrix) - COST NOT COVERED BY MEDICARE PART B  2 shot series recommended in those aged 48 and above     Health Maintenance Due:      Topic Date Due   • Hepatitis C Screening  Never done   • Colorectal Cancer Screening  Never done     Immunizations Due:      Topic Date Due   • Pneumococcal Vaccine: 65+ Years (1 - PCV) Never done   • COVID-19 Vaccine (4 - Booster for Sophia Berliner series) 12/27/2021     Advance Directives   What are advance directives? Advance directives are legal documents that state your wishes and plans for medical care  These plans are made ahead of time in case you lose your ability to make decisions for yourself  Advance directives can apply to any medical decision, such as the treatments you want, and if you want to donate organs  What are the types of advance directives? There are many types of advance directives, and each state has rules about how to use them  You may choose a combination of any of the following:  · Living will: This is a written record of the treatment you want  You can also choose which treatments you do not want, which to limit, and which to stop at a certain time  This includes surgery, medicine, IV fluid, and tube feedings  · Durable power of  for healthcare [facility]):   This is a written record that states who you want to make healthcare choices for you when you are unable to make them for yourself  This person, called a proxy, is usually a family member or a friend  You may choose more than 1 proxy  · Do not resuscitate (DNR) order:  A DNR order is used in case your heart stops beating or you stop breathing  It is a request not to have certain forms of treatment, such as CPR  A DNR order may be included in other types of advance directives  · Medical directive: This covers the care that you want if you are in a coma, near death, or unable to make decisions for yourself  You can list the treatments you want for each condition  Treatment may include pain medicine, surgery, blood transfusions, dialysis, IV or tube feedings, and a ventilator (breathing machine)  · Values history: This document has questions about your views, beliefs, and how you feel and think about life  This information can help others choose the care that you would choose  Why are advance directives important? An advance directive helps you control your care  Although spoken wishes may be used, it is better to have your wishes written down  Spoken wishes can be misunderstood, or not followed  Treatments may be given even if you do not want them  An advance directive may make it easier for your family to make difficult choices about your care  © Copyright [employer] 2018 Information is for End User's use only and may not be sold, redistributed or otherwise used for commercial purposes   All illustrations and images included in CareNotes® are the copyrighted property of [employer]  or [address] [place] [address] 24[employer]

## 2023-01-23 ENCOUNTER — TELEPHONE (OUTPATIENT)
Dept: FAMILY MEDICINE CLINIC | Facility: CLINIC | Age: 78
End: 2023-01-23

## 2023-01-23 NOTE — TELEPHONE ENCOUNTER
I called Lars Vargas ,a gentleman answered and said he would take a message for nikko  I stated to call our office and we would review lab work results

## 2023-01-23 NOTE — TELEPHONE ENCOUNTER
Fax Response Requested: Patient Requests New RX for icosapent ethyl and omeprazole  Called to confirm both escripts were received   Staff confirmed, so no further action needed on our end

## 2023-01-24 ENCOUNTER — TELEPHONE (OUTPATIENT)
Dept: FAMILY MEDICINE CLINIC | Facility: CLINIC | Age: 78
End: 2023-01-24

## 2023-01-24 NOTE — TELEPHONE ENCOUNTER
I left a message and asked patient to call the office back   We would like to discuss lab results  This is the third attempt on calling Peter

## 2023-01-24 NOTE — TELEPHONE ENCOUNTER
Carrington Presley called and left a VM requesting a call back to discuss lab results     Please note the voicemail was received on 1/23/3023 at 11:28 AM

## 2023-01-26 NOTE — TELEPHONE ENCOUNTER
Patient called regarding his two medications that were sent to [facility] on [facility]  Pharmacy said our office had to contact them to verify information  It was confirmed on 1/23/23 at 10:27AM  Patient can  medication  Also discussed hepatitis C lab results and it was normal  Patient had no questions

## 2023-05-10 ENCOUNTER — RA CDI HCC (OUTPATIENT)
Dept: OTHER | Facility: HOSPITAL | Age: 78
End: 2023-05-10

## 2023-05-10 NOTE — PROGRESS NOTES
Yan RUST 77  coding opportunities       Chart reviewed, no opportunity found:   [address]        Patients Insurance     Medicare Insurance: [employer]

## 2023-05-18 ENCOUNTER — APPOINTMENT (OUTPATIENT)
Dept: LAB | Facility: CLINIC | Age: 78
End: 2023-05-18

## 2023-05-18 ENCOUNTER — OFFICE VISIT (OUTPATIENT)
Dept: FAMILY MEDICINE CLINIC | Facility: CLINIC | Age: 78
End: 2023-05-18

## 2023-05-18 VITALS
HEIGHT: 67 IN | BODY MASS INDEX: 23.83 KG/M2 | OXYGEN SATURATION: 98 % | SYSTOLIC BLOOD PRESSURE: 114 MMHG | HEART RATE: 58 BPM | TEMPERATURE: 97.2 F | DIASTOLIC BLOOD PRESSURE: 66 MMHG | WEIGHT: 151.8 LBS

## 2023-05-18 DIAGNOSIS — M62.08 DIASTASIS RECTI: Primary | ICD-10-CM

## 2023-05-18 DIAGNOSIS — K21.9 GASTROESOPHAGEAL REFLUX DISEASE WITHOUT ESOPHAGITIS: ICD-10-CM

## 2023-05-18 DIAGNOSIS — E78.2 MIXED HYPERLIPIDEMIA: ICD-10-CM

## 2023-05-18 DIAGNOSIS — E03.9 ACQUIRED HYPOTHYROIDISM: ICD-10-CM

## 2023-05-18 LAB
ALBUMIN SERPL BCP-MCNC: 3.7 G/DL (ref 3.5–5)
ALP SERPL-CCNC: 87 U/L (ref 46–116)
ALT SERPL W P-5'-P-CCNC: 30 U/L (ref 12–78)
ANION GAP SERPL CALCULATED.3IONS-SCNC: 1 MMOL/L (ref 4–13)
AST SERPL W P-5'-P-CCNC: 26 U/L (ref 5–45)
BASOPHILS # BLD AUTO: 0.02 THOUSANDS/ÂΜL (ref 0–0.1)
BASOPHILS NFR BLD AUTO: 0 % (ref 0–1)
BILIRUB SERPL-MCNC: 0.71 MG/DL (ref 0.2–1)
BUN SERPL-MCNC: 21 MG/DL (ref 5–25)
CALCIUM SERPL-MCNC: 9.5 MG/DL (ref 8.3–10.1)
CHLORIDE SERPL-SCNC: 110 MMOL/L (ref 96–108)
CHOLEST SERPL-MCNC: 130 MG/DL
CO2 SERPL-SCNC: 26 MMOL/L (ref 21–32)
CREAT SERPL-MCNC: 1 MG/DL (ref 0.6–1.3)
EOSINOPHIL # BLD AUTO: 0.17 THOUSAND/ÂΜL (ref 0–0.61)
EOSINOPHIL NFR BLD AUTO: 4 % (ref 0–6)
ERYTHROCYTE [DISTWIDTH] IN BLOOD BY AUTOMATED COUNT: 14.7 % (ref 11.6–15.1)
EST. AVERAGE GLUCOSE BLD GHB EST-MCNC: 117 MG/DL
GFR SERPL CREATININE-BSD FRML MDRD: 73 ML/MIN/1.73SQ M
GLUCOSE P FAST SERPL-MCNC: 89 MG/DL (ref 65–99)
HBA1C MFR BLD: 5.7 %
HCT VFR BLD AUTO: 44.7 % (ref 36.5–49.3)
HDLC SERPL-MCNC: 54 MG/DL
HGB BLD-MCNC: 14.8 G/DL (ref 12–17)
IMM GRANULOCYTES # BLD AUTO: 0.01 THOUSAND/UL (ref 0–0.2)
IMM GRANULOCYTES NFR BLD AUTO: 0 % (ref 0–2)
LDLC SERPL CALC-MCNC: 65 MG/DL (ref 0–100)
LYMPHOCYTES # BLD AUTO: 0.88 THOUSANDS/ÂΜL (ref 0.6–4.47)
LYMPHOCYTES NFR BLD AUTO: 18 % (ref 14–44)
MCH RBC QN AUTO: 29.6 PG (ref 26.8–34.3)
MCHC RBC AUTO-ENTMCNC: 33.1 G/DL (ref 31.4–37.4)
MCV RBC AUTO: 89 FL (ref 82–98)
MONOCYTES # BLD AUTO: 0.71 THOUSAND/ÂΜL (ref 0.17–1.22)
MONOCYTES NFR BLD AUTO: 15 % (ref 4–12)
NEUTROPHILS # BLD AUTO: 3.03 THOUSANDS/ÂΜL (ref 1.85–7.62)
NEUTS SEG NFR BLD AUTO: 63 % (ref 43–75)
NONHDLC SERPL-MCNC: 76 MG/DL
NRBC BLD AUTO-RTO: 0 /100 WBCS
PLATELET # BLD AUTO: 158 THOUSANDS/UL (ref 149–390)
PMV BLD AUTO: 11 FL (ref 8.9–12.7)
POTASSIUM SERPL-SCNC: 4.4 MMOL/L (ref 3.5–5.3)
PROT SERPL-MCNC: 6.7 G/DL (ref 6.4–8.4)
RBC # BLD AUTO: 5 MILLION/UL (ref 3.88–5.62)
SODIUM SERPL-SCNC: 137 MMOL/L (ref 135–147)
T4 FREE SERPL-MCNC: 0.96 NG/DL (ref 0.61–1.12)
TRIGL SERPL-MCNC: 54 MG/DL
TSH SERPL DL<=0.05 MIU/L-ACNC: 2.85 UIU/ML (ref 0.45–4.5)
WBC # BLD AUTO: 4.82 THOUSAND/UL (ref 4.31–10.16)

## 2023-05-18 RX ORDER — M-VIT,TX,IRON,MINS/CALC/FOLIC 27MG-0.4MG
1 TABLET ORAL DAILY
COMMUNITY

## 2023-05-18 NOTE — PROGRESS NOTES
Assessment/Plan:    No problem-specific Assessment & Plan notes found for this encounter  Diagnoses and all orders for this visit:    Diastasis recti  -  Patient presents with concern of worsening outpouching of lower abdomen x 3 months  - Upon evaluation, exam consistent with underlying diastasis recti vs umbilical herniation  -PCP counseled on likely diagnosis and will send patient for evaluation with US abdomen to r/o other etiology at this time      -US abdomen complete; Future    Gastroesophageal reflux disease without esophagitis  -     Patient denies any acute changes with GERD, stable on PPI  -     Continue on current regimen  -     CBC and differential; Future  -     Comprehensive metabolic panel; Future    Mixed hyperlipidemia  -     Patient avoids processed/fatty foods and denies any side effects on statin therapy  -    Continue on current regimen  -     CBC and differential; Future  -     Comprehensive metabolic panel; Future  -     Lipid panel; Future  -     Hemoglobin A1C; Future    Acquired hypothyroidism  -   Patient stable on levothyroxine, denies any side effects on regimen  -  PCP to obtain thyroid lab work at this time  -   T4, free; Future  -     TSH, 3rd generation; Future    Other orders  -     therapeutic multivitamin-minerals (THERAGRAN-M) tablet; Take 1 tablet by mouth daily        Subjective:      Patient ID: Cortez Moon is a 78 y o  male  Patient reports new concern for outpouching of the lower abdomen over the last 3 months  Patient denies any acute abdominal pain, changes in bowel or bladder habits, blood in stool, or changes in color/caliber of stool  Patient reports he is concerned that he may have developed recurrence of an abdominal hernia, which occurred after his gastrointestinal stromal tumor surgery (2008)   Patient denies any fevers, chills, or N/V        Heartburn  He complains of belching   He reports no abdominal pain, no coughing, no dysphagia, no heartburn or no "nausea  Patient reports that symptoms of reflux are well controlled at this time  This is a chronic problem  The current episode started more than 1 year ago  The problem occurs occasionally  The problem has been unchanged  Exacerbated by: Eating foods late, hence he eats his last meal around 3:30pm  Pertinent negatives include no melena or weight loss  Risk factors:  gastrointestinal stromal tumor s/p  partial gastrectomy  He has tried a PPI for the symptoms  The treatment provided significant relief  Hyperlipidemia  This is a chronic problem  The current episode started more than 1 year ago  Exacerbating diseases include hypothyroidism  He has no history of diabetes  There are no known factors aggravating his hyperlipidemia  Current antihyperlipidemic treatment includes statins  The current treatment provides significant improvement of lipids  Risk factors for coronary artery disease include male sex and dyslipidemia  Thyroid Problem  Presents for follow-up (Patient has a hx of hypothyroidism) visit  Patient reports no constipation, palpitations, tremors, weight gain or weight loss  The symptoms have been stable  His past medical history is significant for hyperlipidemia  There is no history of diabetes  The following portions of the patient's history were reviewed and updated as appropriate: allergies, current medications, past family history, past medical history, past social history, past surgical history and problem list     Review of Systems   Constitutional: Negative for weight gain and weight loss  Respiratory: Negative for cough  Cardiovascular: Negative for palpitations  Gastrointestinal: Negative for abdominal pain, constipation, dysphagia, heartburn, melena, nausea and vomiting  Neurological: Negative for tremors           Objective:      /66   Pulse 58   Temp (!) 97 2 °F (36 2 °C)   Ht 5' 6 5\" (1 689 m)   Wt 68 9 kg (151 lb 12 8 oz)   SpO2 98%   BMI 24 13 kg/m²          " Physical Exam  Constitutional:       Appearance: He is well-developed  HENT:      Head: Normocephalic and atraumatic  Nose: Nose normal    Eyes:      Conjunctiva/sclera: Conjunctivae normal    Cardiovascular:      Rate and Rhythm: Normal rate and regular rhythm  Heart sounds: Normal heart sounds  Pulmonary:      Effort: Pulmonary effort is normal       Breath sounds: Normal breath sounds  Abdominal:      General: Bowel sounds are normal       Comments: Protrusion of the periumbilical region b/l  No bruising, tenderness, or pain on palpation   Musculoskeletal:         General: Normal range of motion  Cervical back: Normal range of motion and neck supple  Skin:     General: Skin is warm and dry  Neurological:      Mental Status: He is alert and oriented to person, place, and time

## 2023-05-23 NOTE — RESULT ENCOUNTER NOTE
Hi Ladies,    Can we please call patient and relay the following below:    After review of your lab work, overall things look stable  Blood counts, kidney, liver and thyroid function are normal  Your Hemoglboin A1C 3 month average of blood sugar was slightly elevated at 5 7 but continue with your daily exercises and healthful eating  No concerns for now, will just monitor  Cholesterol levels look great  Please let me know if you have any questions or concerns      Best,  Dr ZHANG

## 2023-05-25 ENCOUNTER — TELEPHONE (OUTPATIENT)
Dept: FAMILY MEDICINE CLINIC | Facility: CLINIC | Age: 78
End: 2023-05-25

## 2023-05-25 NOTE — TELEPHONE ENCOUNTER
Patient called back  Relayed message from Dr Brian Abarca verbatim  Patient verbalized understanding  I told him Dr Brian Abarca will vickie him back  He will keep his phone nearby  He wanted to mention the following:    States he had a brace before but being active it rubbed him and made his skin feel raw  He used to do pushups (200 / 3x per week) Asking if that is okay or will it make it worse    When he eats too much it makes it more difficult to breath until he starts digesting his food       Patient will expect a call back  Thank you

## 2023-05-26 ENCOUNTER — TELEPHONE (OUTPATIENT)
Dept: FAMILY MEDICINE CLINIC | Facility: CLINIC | Age: 78
End: 2023-05-26

## 2023-05-26 NOTE — TELEPHONE ENCOUNTER
----- Message from Lc Do MD sent at 5/22/2023  9:43 PM EDT -----  Hi Ladies,    Can we please call patient and relay the following below:    After review of your lab work, overall things look stable  Blood counts, kidney, liver and thyroid function are normal  Your Hemoglboin A1C 3 month average of blood sugar was slightly elevated at 5 7 but continue with your daily exercises and healthful eating  No concerns for now, will just monitor  Cholesterol levels look great  Please let me know if you have any questions or concerns      Best,  Dr ZHANG

## 2023-05-26 NOTE — TELEPHONE ENCOUNTER
VM received on 5/26 at 11:54 AM    Mary Beth Dunaway, this is [employer] calling YOB: 1945  I'm calling in reference to blood work that was done on me  And also if I'm gonna be needing a brace or something for my problem, you have to talk to Doctor Salima Mullins in reference to what I might be needing and if I should come in and also you can reach me at [phone]  I'd appreciate a call  Thank you and have a great day  Have a great holiday  Bye bye

## 2023-05-26 NOTE — TELEPHONE ENCOUNTER
I called Pino Nguyen and was able to leave a message on his  voicemail to call the office back at his convenience   I provided the office phone #

## 2023-05-30 NOTE — TELEPHONE ENCOUNTER
I called Isaiah Key and left a message on his identifying voicemail  I reviewet the lab note   Doctor would like to schedule an apportionment for Isaiah Key in regards to his brace that he asked about  I mention to call the office back so we could schedule one

## 2023-05-31 NOTE — TELEPHONE ENCOUNTER
Called patient to schedule an appointment regarding the brace that the patient requested   No answer, left voicemail to call the office back at [phone]

## 2023-06-01 NOTE — TELEPHONE ENCOUNTER
Patient returned call for scheduling appointment regarding brace   Patient is scheduled for 6/9/23 at 9:30 AM  Patient also inquired about next appointment as well that is scheduled for 6/28/23 at 9:30 AM

## 2023-06-09 ENCOUNTER — OFFICE VISIT (OUTPATIENT)
Dept: FAMILY MEDICINE CLINIC | Facility: CLINIC | Age: 78
End: 2023-06-09
Payer: COMMERCIAL

## 2023-06-09 VITALS
SYSTOLIC BLOOD PRESSURE: 122 MMHG | WEIGHT: 151.2 LBS | DIASTOLIC BLOOD PRESSURE: 76 MMHG | OXYGEN SATURATION: 99 % | BODY MASS INDEX: 23.73 KG/M2 | TEMPERATURE: 96 F | HEIGHT: 67 IN | HEART RATE: 82 BPM

## 2023-06-09 DIAGNOSIS — M62.08 DIASTASIS RECTI: Primary | ICD-10-CM

## 2023-06-09 DIAGNOSIS — R10.9 ABDOMINAL DISCOMFORT: ICD-10-CM

## 2023-06-09 DIAGNOSIS — C49.A0 MALIGNANT GASTROINTESTINAL STROMAL TUMOR, UNSPECIFIED SITE (HCC): ICD-10-CM

## 2023-06-09 PROCEDURE — 99214 OFFICE O/P EST MOD 30 MIN: CPT | Performed by: FAMILY MEDICINE

## 2023-06-09 NOTE — PROGRESS NOTES
Assessment/Plan:    No problem-specific Assessment & Plan notes found for this encounter  Diagnoses and all orders for this visit:    Diastasis recti  -Patient presents again with concern of continued outpouching of lower abdomen x 3 months, reporting discomfort when laying on either side for sleeping and GI symptoms (i e  increased belching/abdominal discomfort)  - PCP has agreed to send patient for general surgery evaluation to see if patient is possibly experiencing secondary symptoms due to diastasis recti  - Elastic Bandages & Supports (Abdominal Binder/Elastic Small) MISC; Use in the morning  Dispense: 1 each; Refill: 0  -     Ambulatory Referral to General Surgery; Future    Abdominal discomfort  - Elastic Bandages & Supports (Abdominal Binder/Elastic Small) MISC; Use in the morning  Dispense: 1 each; Refill: 0  -     Ambulatory Referral to General Surgery; Future      Subjective:      Patient ID: George Bradford is a 78 y o  male      Patient reports that he had tried using a back brace around the area of his abdomen without significant change in symptoms  PCP discussed with patient that if anything abdominal binder would be indicated for diastases recti  Patient endorses concern as he reports intermittent abdominal discomfort  He reports that if he is lying on either side while sleeping he will feel a constant pressure due to the protruding abdomen  Patient reports he will get relief of pressure sensation when lying on his back  Of note, patient also reports intermittent discomfort after eating noting that he is experiencing increased gas/belching  PCP discussed that this likely is not due to diastasis recti especially given his history of 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  Patient denies any episodes of nausea, vomiting, abnormal color change or change in eating habits  However, PCP has agreed to send patient for general surgery evaluation to see if "patient is possibly experiencing secondary symptoms due to diastasis recti             The following portions of the patient's history were reviewed and updated as appropriate: allergies, current medications, past family history, past medical history, past social history, past surgical history and problem list     Review of Systems   Constitutional: Negative for appetite change  Respiratory: Negative for chest tightness and shortness of breath  Gastrointestinal:        Abdominal discomfort  Increased belching         Objective:      /76   Pulse 82   Temp (!) 96 °F (35 6 °C)   Ht 5' 6 5\" (1 689 m)   Wt 68 6 kg (151 lb 3 2 oz)   SpO2 99%   BMI 24 04 kg/m²          Physical Exam  Constitutional:       Appearance: He is well-developed  HENT:      Head: Normocephalic and atraumatic  Nose: Nose normal    Eyes:      Conjunctiva/sclera: Conjunctivae normal    Cardiovascular:      Rate and Rhythm: Normal rate and regular rhythm  Heart sounds: Normal heart sounds  Pulmonary:      Effort: Pulmonary effort is normal       Breath sounds: Normal breath sounds  Abdominal:      General: Bowel sounds are normal       Palpations: Abdomen is soft  Comments: Protrusion of the periumbilical region b/l  No bruising, tenderness, or pain on palpation    Musculoskeletal:         General: Normal range of motion  Cervical back: Normal range of motion and neck supple  Skin:     General: Skin is warm and dry  Neurological:      Mental Status: He is alert and oriented to person, place, and time           "

## 2023-06-20 ENCOUNTER — TELEPHONE (OUTPATIENT)
Dept: FAMILY MEDICINE CLINIC | Facility: CLINIC | Age: 78
End: 2023-06-20

## 2023-06-20 NOTE — TELEPHONE ENCOUNTER
Loyd Paris called in asking where should he go for his abdominal brace  I provided [employer] located in [facility]   He asked if we could reprint order for it  I reprinted order and will fax over to [employer] when they open @ 10:00 am       I was able to call [employer] and get a fax # [phone]  Fax was successfully sent over

## 2023-06-27 ENCOUNTER — TELEPHONE (OUTPATIENT)
Dept: FAMILY MEDICINE CLINIC | Facility: CLINIC | Age: 78
End: 2023-06-27

## 2023-06-27 NOTE — TELEPHONE ENCOUNTER
I left message on Bryan's voicemail to confirm his appointment for tomorrow   I asked he call the office if he has to cancel or reschedule  Pressing option 1

## 2023-06-27 NOTE — TELEPHONE ENCOUNTER
Patient called regarding voicemail that was left  Patient confirmed appointment  He asked if he should fast for any labs that may be ordered  Patient also mentioned being very tired and having a hard time eating

## 2023-06-28 ENCOUNTER — OFFICE VISIT (OUTPATIENT)
Dept: FAMILY MEDICINE CLINIC | Facility: CLINIC | Age: 78
End: 2023-06-28
Payer: COMMERCIAL

## 2023-06-28 VITALS
HEART RATE: 68 BPM | HEIGHT: 67 IN | DIASTOLIC BLOOD PRESSURE: 72 MMHG | BODY MASS INDEX: 24.17 KG/M2 | TEMPERATURE: 97.2 F | OXYGEN SATURATION: 99 % | SYSTOLIC BLOOD PRESSURE: 128 MMHG | WEIGHT: 154 LBS

## 2023-06-28 DIAGNOSIS — M62.08 DIASTASIS RECTI: ICD-10-CM

## 2023-06-28 DIAGNOSIS — Z23 NEED FOR SHINGLES VACCINE: ICD-10-CM

## 2023-06-28 DIAGNOSIS — R10.9 ABDOMINAL DISCOMFORT: Primary | ICD-10-CM

## 2023-06-28 PROCEDURE — 99213 OFFICE O/P EST LOW 20 MIN: CPT | Performed by: FAMILY MEDICINE

## 2023-06-28 RX ORDER — POLYETHYLENE GLYCOL 3350 17 G/17G
17 POWDER, FOR SOLUTION ORAL DAILY
COMMUNITY

## 2023-06-28 NOTE — PROGRESS NOTES
Name: Isha Rasmussen      : [id number]      MRN: [id number]  Encounter Provider: Deon Leal MD  Encounter Date: 2023   Encounter department: [facility]     1  Abdominal discomfort  Comments:  -Hx of GIST w/ gastrectomy (), and partial SBO  -Abd pain when lying on sides x 4 months  -Appreciate recs from gen surg appointment tomorrow  -Advised scheduling of gastro appt    2  Diastasis recti  Comments:  Abdominal binder providing support and relief    3  Need for shingles vaccine  -     Zoster Vaccine Recombinant IM            Subjective     Patient is a 79 y/o M with PMH of gastrointestinal stromal tumor s/p  partial gastrectomy (), HTN, HLD, episodes of partial SBO, and prostate cancer s/p prostatectomy () complicated by SBO s/p small bowel resection and reanastomosis presenting to the clinic for a follow up on abdominal pain while lying on side  Patient was given an abdominal binder and started on PPI previously, which he states has been helping a lot and has decreased his gas/belching  Patient continues to have abdominal pain while lying on side, but not when lying flat  Last meal is 4 pm, goes to bed at 10 pm    Patient has difficulty breathing when eating too much   Patient denies any acute abdominal pain, changes in bowel or bladder habits, blood in stool, or changes in color/caliber of stool  No N/V, or diarrhea  Has constipation and uses Miralax daily  Breakfast: Oatmeal and fruit  Lunch: [place] and salad  Dinner: 4 oz soup  Patient was previously referred to General Surgery (apointment tomorrow) and Gastroenterology ( has not gotten a call)  Review of Systems   Constitutional: Negative for chills and fever  HENT: Negative for ear pain and sore throat  Eyes: Negative for pain and visual disturbance  Respiratory: Negative for cough and shortness of breath  Cardiovascular: Negative for chest pain and palpitations  Gastrointestinal: Positive for constipation  Negative for abdominal pain, blood in stool, diarrhea, nausea, rectal pain and vomiting  Genitourinary: Negative for dysuria and hematuria  Musculoskeletal: Negative for arthralgias and back pain  Skin: Negative for color change and rash  Neurological: Negative for seizures and syncope  Past Medical History:   Diagnosis Date   • Allergic      Past Surgical History:   Procedure Laterality Date   • PROSTATE SURGERY  2022   • SMALL INTESTINE SURGERY N/A    • STOMACH SURGERY  1998     Family History   Problem Relation Age of Onset   • Heart attack Mother    • Heart attack Father    • Hypertension Father    • Heart attack Sister    • Heart attack Brother      Social History     Socioeconomic History   • Marital status: Single     Spouse name: None   • Number of children: None   • Years of education: None   • Highest education level: None   Occupational History   • None   Tobacco Use   • Smoking status: Never   • Smokeless tobacco: Never   Vaping Use   • Vaping Use: Never used   Substance and Sexual Activity   • Alcohol use: Not Currently     Alcohol/week: 1 0 standard drink of alcohol     Types: 1 Glasses of wine per week     Comment: occassional   • Drug use: Never   • Sexual activity: None   Other Topics Concern   • None   Social History Narrative   • None     Social Determinants of Health     Financial Resource Strain: Low Risk  (1/19/2023)    Overall Financial Resource Strain (CARDIA)    • Difficulty of Paying Living Expenses: Not hard at all   Food Insecurity: Not on file   Transportation Needs: No Transportation Needs (1/19/2023)    PRAPARE - Transportation    • Lack of Transportation (Medical): No    • Lack of Transportation (Non-Medical):  No   Physical Activity: Not on file   Stress: Not on file   Social Connections: Not on file   Intimate Partner Violence: Not on file   Housing Stability: Not on file     Current Outpatient Medications on File Prior to "Visit   Medication Sig   • ARTIFICIAL TEAR SOLUTION OP Apply 2 drops to eye daily   • atorvastatin (LIPITOR) 10 mg tablet Take 10 mg by mouth daily   • Biotin 1000 MCG tablet Take 2,500 mcg by mouth 3 (three) times a week   • Elastic Bandages & Supports (Abdominal Binder/Elastic Small) MISC Use in the morning   • Icosapent Ethyl 1 g CAPS Take 1 capsule (1 g total) by mouth 2 (two) times a day   • levothyroxine 50 mcg tablet Take 50 mcg by mouth daily   • omeprazole (PriLOSEC) 10 mg delayed release capsule Take 1 capsule (10 mg total) by mouth daily   • polyethylene glycol (MIRALAX) 17 g packet Take 17 g by mouth daily   • therapeutic multivitamin-minerals (THERAGRAN-M) tablet Take 1 tablet by mouth daily   • co-enzyme Q-10 100 mg capsule Take 100 mg by mouth in the morning (Patient not taking: Reported on 6/28/2023)     Allergies   Allergen Reactions   • Iodine - Food Allergy Hives, Anaphylaxis and Rash     CT dye  CT dye     • Nuts - Food Allergy Itching     Tongue irritation and pruritis  Itching of lips and tongue     • Sulfa Antibiotics Hives and Rash   • Codeine Abdominal Pain and Other (See Comments)     Stomach pain     • Shellfish Allergy - Food Allergy Rash   • Iodinated Contrast Media Rash     Immunization History   Administered Date(s) Administered   • COVID-19 MODERNA VACC 0 5 ML IM 04/02/2021, 04/30/2021, 11/01/2021   • Influenza, high dose seasonal 0 7 mL 12/09/2022   • Pneumococcal Conjugate Vaccine 20-valent (Pcv20), Polysace 01/19/2023       Objective     /72   Pulse 68   Temp (!) 97 2 °F (36 2 °C)   Ht 5' 6 5\" (1 689 m)   Wt 69 9 kg (154 lb)   SpO2 99%   BMI 24 48 kg/m²     Physical Exam  Vitals reviewed  Constitutional:       General: He is not in acute distress  Appearance: Normal appearance  He is normal weight  He is not ill-appearing  HENT:      Head: Normocephalic  Nose: Nose normal  No rhinorrhea  Eyes:      General: No scleral icterus       Extraocular Movements: " Extraocular movements intact  Cardiovascular:      Rate and Rhythm: Normal rate and regular rhythm  Pulses: Normal pulses  Heart sounds: Normal heart sounds  No murmur heard  Pulmonary:      Effort: Pulmonary effort is normal  No respiratory distress  Breath sounds: Normal breath sounds  Abdominal:      General: Bowel sounds are normal       Palpations: Abdomen is soft  Tenderness: There is no abdominal tenderness  Comments: Protrusion of the periumbilical region b/l  No bruising, tenderness, or pain on palpation     Musculoskeletal:      Right lower leg: No edema  Left lower leg: No edema  Neurological:      Mental Status: He is alert and oriented to person, place, and time  Gait: Gait normal    Psychiatric:         Mood and Affect: Mood normal          Behavior: Behavior normal          Thought Content:  Thought content normal        Familia Glover MD

## 2023-06-29 ENCOUNTER — CONSULT (OUTPATIENT)
Dept: SURGERY | Facility: CLINIC | Age: 78
End: 2023-06-29
Payer: COMMERCIAL

## 2023-06-29 VITALS
HEART RATE: 53 BPM | DIASTOLIC BLOOD PRESSURE: 66 MMHG | SYSTOLIC BLOOD PRESSURE: 116 MMHG | OXYGEN SATURATION: 98 % | BODY MASS INDEX: 24.33 KG/M2 | WEIGHT: 155 LBS | HEIGHT: 67 IN

## 2023-06-29 DIAGNOSIS — M62.08 DIASTASIS RECTI: ICD-10-CM

## 2023-06-29 DIAGNOSIS — R10.9 ABDOMINAL DISCOMFORT: ICD-10-CM

## 2023-06-29 RX ORDER — ZOSTER VACCINE RECOMBINANT, ADJUVANTED 50 MCG/0.5
0.5 KIT INTRAMUSCULAR ONCE
Qty: 1 EACH | Refills: 1 | Status: SHIPPED | OUTPATIENT
Start: 2023-06-29 | End: 2023-06-29

## 2023-07-01 NOTE — ASSESSMENT & PLAN NOTE
Noted abdominal discomfort in the epigastric right upper quadrant  Had some periumbilical pain as well  Unclear etiology  However though it does tend to happen more in the later night after eating  There are certain foods which trigger this  It may not be solely related to his current diastases with possible hernia  There could be an underlying gallbladder component  Recent ultrasound not show any evident evidence of gallstones  However we will order HIDA scan to evaluate for biliary dyskinesia

## 2023-07-01 NOTE — PROGRESS NOTES
Assessment/Plan:    Diastasis recti  On exam there is noted a diastases recti however there is a questionable hernia component as well  No significant pain on palpation  However we will further evaluate this with a CT scan of the abdomen pelvis to determine if any neurosurgery intervention is warranted  If strictly diastases then potential plastic surgery consultation  Abdominal discomfort  Noted abdominal discomfort in the epigastric right upper quadrant  Had some periumbilical pain as well  Unclear etiology  However though it does tend to happen more in the later night after eating  There are certain foods which trigger this  It may not be solely related to his current diastases with possible hernia  There could be an underlying gallbladder component  Recent ultrasound not show any evident evidence of gallstones  However we will order HIDA scan to evaluate for biliary dyskinesia  Diagnoses and all orders for this visit:    Diastasis recti  -     Ambulatory Referral to General Surgery  -     CT abdomen pelvis w contrast; Future    Abdominal discomfort  -     Ambulatory Referral to General Surgery  -     NM hepatobiliary w rx; Future  -     CT abdomen pelvis w contrast; Future          Subjective:      Patient ID: Fletcher Hudson is a 78 y o  male  44-year-old gent with known history of GIST status post partial gastrectomy 2008, prostate cancer status post prostatectomy in March 2022, SBO, GERD, hypothyroidism, hyperlipidemia, presents today in consultation evaluation abdominal discomfort  Patient was recently seen by his twice over the last month and discussed signs symptoms of abdominal discomfort  He also complained of some outpouching of his upper abdomen  During the first evaluation by his PCP was felt to have possible diastases recti was a prescribed abdominal binder  As he told his PCP the symptoms going on for the last couple of months    Pain is worse when laying on his side and completely goes away when laying on his back  Pain is described more as a pressure and can sometimes be sharp  Patient describes of significant bloating  He also feels full after eating and also has difficulty breathing sometimes if he eats too much  Patient states that this usually occurs this bloating type feeling that is, after eating and more late at night  He also states that certain foods also trigger this and tends to be more of the fatty or greasy foods  No nausea vomiting  No fevers chills  No significant chest pain  The following portions of the patient's history were reviewed and updated as appropriate:   He  has a past medical history of Allergic  He   Patient Active Problem List    Diagnosis Date Noted   • Diastasis recti 06/09/2023   • Abdominal discomfort 06/09/2023   • Malignant gastrointestinal stromal tumor, unspecified site ([place] [address] ) 12/09/2022   • Multinodular goiter 12/08/2022   • Prostate cancer ([place] [address] ) 01/07/2022   • History of gastrointestinal stromal tumor (GIST) 09/02/2021   • Hyperlipidemia 09/02/2021   • Hypothyroidism 09/02/2021   • Partial small bowel obstruction ([place] [address] ) 09/02/2021   • GERD (gastroesophageal reflux disease) 08/06/2014     He  has a past surgical history that includes Prostate surgery (2022); Small intestine surgery (N/A); and Stomach surgery (1998)  His family history includes Heart attack in his brother, father, mother, and sister; Hypertension in his father  He  reports that he has never smoked  He has never used smokeless tobacco  He reports that he does not currently use alcohol after a past usage of about 1 0 standard drink of alcohol per week  He reports that he does not use drugs    Current Outpatient Medications   Medication Sig Dispense Refill   • ARTIFICIAL TEAR SOLUTION OP Apply 2 drops to eye daily     • atorvastatin (LIPITOR) 10 mg tablet Take 10 mg by mouth daily     • Biotin 1000 MCG tablet Take 2,500 mcg by mouth 3 (three) times a week     • "Elastic Bandages & Supports (Abdominal Binder/Elastic Small) MISC Use in the morning 1 each 0   • Icosapent Ethyl 1 g CAPS Take 1 capsule (1 g total) by mouth 2 (two) times a day 180 capsule 1   • levothyroxine 50 mcg tablet Take 50 mcg by mouth daily     • omeprazole (PriLOSEC) 10 mg delayed release capsule Take 1 capsule (10 mg total) by mouth daily 90 capsule 1   • polyethylene glycol (MIRALAX) 17 g packet Take 17 g by mouth daily     • therapeutic multivitamin-minerals (THERAGRAN-M) tablet Take 1 tablet by mouth daily     • co-enzyme Q-10 100 mg capsule Take 100 mg by mouth in the morning (Patient not taking: Reported on 6/28/2023)       No current facility-administered medications for this visit  He is allergic to iodine - food allergy, nuts - food allergy, sulfa antibiotics, codeine, shellfish allergy - food allergy, and iodinated contrast media       Review of Systems      Review of systems completed, all negative except as noted HPI  Objective:      /66 (BP Location: Left arm, Patient Position: Sitting, Cuff Size: Standard)   Pulse (!) 53   Ht 5' 6 5\" (1 689 m)   Wt 70 3 kg (155 lb)   SpO2 98%   BMI 24 64 kg/m²          Physical Exam  Vitals reviewed  Constitutional:       General: He is not in acute distress  Appearance: Normal appearance  He is normal weight  He is not ill-appearing, toxic-appearing or diaphoretic  HENT:      Head: Normocephalic and atraumatic  Right Ear: External ear normal       Left Ear: External ear normal    Eyes:      General: No scleral icterus  Right eye: No discharge  Left eye: No discharge  Cardiovascular:      Rate and Rhythm: Normal rate and regular rhythm  Heart sounds: Normal heart sounds  No friction rub  No gallop  Pulmonary:      Effort: Pulmonary effort is normal  No respiratory distress  Breath sounds: Normal breath sounds  No stridor  No wheezing or rhonchi  Abdominal:      General: Abdomen is flat   There " is no distension  Palpations: Abdomen is soft  There is no mass  Tenderness: There is abdominal tenderness (epigastric / periumbilical)  Hernia: No hernia (questionable hernia) is present  Comments: Noted midline upper abdominal scar  Healed well as a bulge worsened with Valsalva consistent with diastases recti  Questionable fascial edge felt which therefore could be an underlying hernia  Musculoskeletal:         General: No swelling, tenderness, deformity or signs of injury  Normal range of motion  Cervical back: Normal range of motion and neck supple  Skin:     General: Skin is warm and dry  Coloration: Skin is not jaundiced or pale  Findings: No bruising or erythema  Neurological:      General: No focal deficit present  Mental Status: He is alert and oriented to person, place, and time  Cranial Nerves: No cranial nerve deficit  Psychiatric:         Mood and Affect: Mood normal          Behavior: Behavior normal          Thought Content: Thought content normal          Judgment: Judgment normal            Notes:    Recent PCP note dated June 28, 2023 and June 9, 2023 was personally reviewed by me

## 2023-07-01 NOTE — ASSESSMENT & PLAN NOTE
On exam there is noted a diastases recti however there is a questionable hernia component as well  No significant pain on palpation  However we will further evaluate this with a CT scan of the abdomen pelvis to determine if any neurosurgery intervention is warranted  If strictly diastases then potential plastic surgery consultation

## 2023-07-05 ENCOUNTER — TELEPHONE (OUTPATIENT)
Dept: SURGERY | Facility: CLINIC | Age: 78
End: 2023-07-05

## 2023-07-05 NOTE — TELEPHONE ENCOUNTER
Patient was scheduled today for a CT scan with contrast.  He is allergic to IV contrast (not severe, he gets blotches). In the past he has taken benadryl and something else (he cannot remember what) prior to the scan and has not had a reaction. Is this something you would recommend? Please advise.

## 2023-07-06 DIAGNOSIS — M62.08 DIASTASIS RECTI: Primary | ICD-10-CM

## 2023-07-07 ENCOUNTER — TELEPHONE (OUTPATIENT)
Dept: SURGERY | Facility: CLINIC | Age: 78
End: 2023-07-07

## 2023-07-07 ENCOUNTER — TELEPHONE (OUTPATIENT)
Dept: FAMILY MEDICINE CLINIC | Facility: CLINIC | Age: 78
End: 2023-07-07

## 2023-07-07 NOTE — TELEPHONE ENCOUNTER
Spoke with patient. Informed him that the CT order will remain without contrast.  Explained that the risk out-weights the benefit in this case. He verbalized understanding. He will keep appts for CT and Hida.

## 2023-07-07 NOTE — TELEPHONE ENCOUNTER
Patient had called in asking if he could get the CT with contrast and he would be able to take medication before hand after. It was ordered by Heladio Owens.  He should contact that office to see if they would allow him to do it with contrast. Patient also wanted it sent to [facility] instead of picking it up at the [facility].

## 2023-07-10 ENCOUNTER — HOSPITAL ENCOUNTER (OUTPATIENT)
Dept: CT IMAGING | Facility: HOSPITAL | Age: 78
Discharge: HOME | End: 2023-07-10
Attending: SURGERY

## 2023-07-10 ENCOUNTER — HOSPITAL ENCOUNTER (OUTPATIENT)
Dept: CT IMAGING | Facility: HOSPITAL | Age: 78
Discharge: HOME | End: 2023-07-10
Attending: SURGERY
Payer: COMMERCIAL

## 2023-07-10 DIAGNOSIS — M62.08 DIASTASIS RECTI: ICD-10-CM

## 2023-07-10 DIAGNOSIS — R10.9 ABDOMINAL DISCOMFORT: ICD-10-CM

## 2023-07-10 PROCEDURE — 74176 CT ABD & PELVIS W/O CONTRAST: CPT

## 2023-07-10 PROCEDURE — G1004 CDSM NDSC: HCPCS

## 2023-07-14 ENCOUNTER — HOSPITAL ENCOUNTER (OUTPATIENT)
Dept: NUCLEAR MEDICINE | Facility: HOSPITAL | Age: 78
Discharge: HOME | End: 2023-07-14
Attending: SURGERY
Payer: COMMERCIAL

## 2023-07-14 DIAGNOSIS — R10.9 ABDOMINAL DISCOMFORT: ICD-10-CM

## 2023-07-14 PROCEDURE — A9537 TC99M MEBROFENIN: HCPCS

## 2023-07-14 PROCEDURE — 78227 HEPATOBIL SYST IMAGE W/DRUG: CPT

## 2023-07-14 PROCEDURE — G1004 CDSM NDSC: HCPCS

## 2023-07-14 RX ORDER — SINCALIDE 5 UG/5ML
0.02 INJECTION, POWDER, LYOPHILIZED, FOR SOLUTION INTRAVENOUS
Status: COMPLETED | OUTPATIENT
Start: 2023-07-14 | End: 2023-07-14

## 2023-07-14 RX ADMIN — SINCALIDE 1.4 MCG: 5 INJECTION, POWDER, LYOPHILIZED, FOR SOLUTION INTRAVENOUS at 12:23

## 2023-07-21 ENCOUNTER — TELEPHONE (OUTPATIENT)
Dept: SURGERY | Facility: CLINIC | Age: 78
End: 2023-07-21

## 2023-07-21 NOTE — TELEPHONE ENCOUNTER
Patients girlfriend, Renan Worthington, called re: missed calls from you. Can you please call her back, Candelario Elliott has a hearing disability. Her # [phone].

## 2023-08-11 ENCOUNTER — TELEPHONE (OUTPATIENT)
Dept: FAMILY MEDICINE CLINIC | Facility: CLINIC | Age: 78
End: 2023-08-11

## 2023-08-11 NOTE — TELEPHONE ENCOUNTER
Patient called in stating he is weak and tired all the time lately when he is trying to relax. No falls or unsteady balance. He is eating and getting enough sleep. Patient is inquiring if he should get labs completed or any testing done. Patient is coming in 8/28 to recheck abdominal pain. Should patient come in sooner-same day, urgent care, or ER? Call patient back at [phone]. Advice?

## 2023-08-25 ENCOUNTER — TELEPHONE (OUTPATIENT)
Dept: FAMILY MEDICINE CLINIC | Facility: CLINIC | Age: 78
End: 2023-08-25

## 2023-08-28 ENCOUNTER — OFFICE VISIT (OUTPATIENT)
Dept: FAMILY MEDICINE CLINIC | Facility: CLINIC | Age: 78
End: 2023-08-28
Payer: COMMERCIAL

## 2023-08-28 VITALS
SYSTOLIC BLOOD PRESSURE: 126 MMHG | HEIGHT: 67 IN | TEMPERATURE: 96.2 F | OXYGEN SATURATION: 95 % | DIASTOLIC BLOOD PRESSURE: 70 MMHG | WEIGHT: 153 LBS | BODY MASS INDEX: 24.01 KG/M2 | HEART RATE: 55 BPM

## 2023-08-28 DIAGNOSIS — E78.2 MIXED HYPERLIPIDEMIA: ICD-10-CM

## 2023-08-28 DIAGNOSIS — K21.9 GASTROESOPHAGEAL REFLUX DISEASE WITHOUT ESOPHAGITIS: ICD-10-CM

## 2023-08-28 DIAGNOSIS — R10.9 ABDOMINAL DISCOMFORT: Primary | ICD-10-CM

## 2023-08-28 PROCEDURE — 99214 OFFICE O/P EST MOD 30 MIN: CPT | Performed by: FAMILY MEDICINE

## 2023-08-28 RX ORDER — ICOSAPENT ETHYL 1000 MG/1
1 CAPSULE ORAL 2 TIMES DAILY
Qty: 180 CAPSULE | Refills: 1 | Status: SHIPPED | OUTPATIENT
Start: 2023-08-28

## 2023-08-28 RX ORDER — OMEPRAZOLE 10 MG/1
10 CAPSULE, DELAYED RELEASE ORAL DAILY
Qty: 90 CAPSULE | Refills: 1 | Status: SHIPPED | OUTPATIENT
Start: 2023-08-28

## 2023-08-28 NOTE — PROGRESS NOTES
Assessment/Plan:    No problem-specific Assessment & Plan notes found for this encounter. Diagnoses and all orders for this visit:    Abdominal discomfort  -Patient underwent dilation with general surgery, imaging of CT abdomen pelvis without contrast showing abdominal adhesions, moderately increased stool burden ie constipation, and diastases recti with no definitive hernia  -PCP reviewed findings with patient and noted importance of continued daily MiraLAX in setting of constipation  -PCP discussed with patient that given findings of adhesions and stool burden may be contributing factors to overall abdominal discomfort, less likely any contribution from diastasis recti at this time  -Hence, patient shares with PCP that him and his significant other have discussed pursuing follow-up with [facility] given his history of gastral intestinal stromal tumor    Mixed hyperlipidemia  -Patient continues to eat a well-balanced diet with minimal fatty/fried foods  -Continue with omega-3 fatty acids at this time  - Icosapent Ethyl 1 g CAPS; Take 1 capsule (1 g total) by mouth 2 (two) times a day    Gastroesophageal reflux disease without esophagitis  Comments:  -Patient reports significant improvement in GERD symptoms on Prilosec  May continue on current regimen  Orders:  -     omeprazole (PriLOSEC) 10 mg delayed release capsule; Take 1 capsule (10 mg total) by mouth daily        Subjective:      Patient ID: Donell Palm is a 78 y.o. male. Patient returns for follow up of abdominal discomfort, was evaluated by Gen Surgery and completed CT abdomen pelvis wo contrast showing, " 1. Postsurgical changes in the anterior abdominal wall with diastases recti, but no definite hernia. 2. Postsurgical changes in the stomach and small bowel with multiple small bowel loops closely apposed to the anterior abdominal wall, suggesting adhesions3.  Moderately increased stool burden throughout the colon, suggesting constipation. Patient also had HIDA scan which showed overall normal hepatobiliary function and contractile response of gallbladder to CCK  PCP review the above which patient reports he had discussed with his significant other Refugio White. After further conversation, it sounds as though patient has decided to follow-up with [facility] gastroenterology as they are familiar with his history of gastrointestinal stromal tumor. PCP has requested medical authorization release at this time to review records from [facility]. Patient has not experienced any significant weight loss, denies any blood in the stools, but does report continued abdominal discomfort        The following portions of the patient's history were reviewed and updated as appropriate: allergies, current medications, past family history, past medical history, past social history, past surgical history and problem list.    Review of Systems   Constitutional: Negative for chills and fever. Respiratory: Negative for shortness of breath. Gastrointestinal: Positive for constipation. Negative for blood in stool. Objective:      /70   Pulse 55   Temp (!) 96.2 °F (35.7 °C)   Ht 5' 6.5" (1.689 m)   Wt 69.4 kg (153 lb)   SpO2 95%   BMI 24.32 kg/m²          Physical Exam  Constitutional:       Appearance: He is well-developed. HENT:      Head: Normocephalic and atraumatic. Nose: Nose normal.   Eyes:      Conjunctiva/sclera: Conjunctivae normal.   Cardiovascular:      Rate and Rhythm: Normal rate and regular rhythm. Heart sounds: Normal heart sounds. Pulmonary:      Effort: Pulmonary effort is normal.      Breath sounds: Normal breath sounds. Abdominal:      Comments: Abdominal binder present   Musculoskeletal:         General: Normal range of motion. Cervical back: Normal range of motion and neck supple. Skin:     General: Skin is warm and dry.    Neurological:      Mental Status: He is alert and oriented to person, place, and time.

## 2023-09-10 DIAGNOSIS — E03.9 ACQUIRED HYPOTHYROIDISM: Primary | ICD-10-CM

## 2023-09-11 NOTE — TELEPHONE ENCOUNTER
Yes, this is Gladis Harrison date of birth, 1945. I'm calling in reference to a prescription levothyroxine and [facility] keeps leaving mails that I didn't e-mail so I didn't receive. They didn't receive authorization to give me the prescription. The number on it's [id number]. That's levothyroxine. And the other thing is I feel very, very tired and weak all the time and I was wondering if I should go for blood work and I'll be checked out. I think it's important, if you can give me authorization to get blood work, I'm going to [facility] tomorrow in [place] to the urologist to be checked out. I think it's for the last time there, but I definitely have to pay attention to this problem I'm having. If you can, please give me a call at [phone]. I'm on the job today and I appreciate a call back and when I can  the medication at [facility] on [facility], [facility]. AND [facility]. Thank you so much and you have a great day. Thank you. Bye.    *Voicemail left.

## 2023-09-12 RX ORDER — LEVOTHYROXINE SODIUM 0.05 MG/1
50 TABLET ORAL DAILY
Qty: 90 TABLET | Refills: 1 | Status: SHIPPED | OUTPATIENT
Start: 2023-09-12

## 2023-09-12 NOTE — TELEPHONE ENCOUNTER
Hi Ladies,    Please note that patient had blood work done about 4 months ago. At this point, given his multiple symptoms I have recommended he follow up with Gastroenterology given his hx of gastric cancer. I think this will be the most effective way to get all the appropriate testing that patient needs at this time. I have refilled his levothyroxine.     Thanks,

## 2023-09-29 ENCOUNTER — TELEPHONE (OUTPATIENT)
Dept: FAMILY MEDICINE CLINIC | Facility: CLINIC | Age: 78
End: 2023-09-29

## 2023-09-29 NOTE — TELEPHONE ENCOUNTER
Patient called asking if Dr. Tone Gramajo could call directy his previous gastroenterologist and get a closer appointment for him since he called to make an appointment and was told that the next available apt would be for January, but he stated that he needs something closer and for the reasons of  previous surgery he needs to been by this Dr.    Dr. Estevan Brink  LQDBE:[phone]

## 2023-10-18 ENCOUNTER — APPOINTMENT (OUTPATIENT)
Dept: RADIOLOGY | Facility: CLINIC | Age: 78
End: 2023-10-18
Payer: COMMERCIAL

## 2023-10-18 ENCOUNTER — OFFICE VISIT (OUTPATIENT)
Dept: FAMILY MEDICINE CLINIC | Facility: CLINIC | Age: 78
End: 2023-10-18

## 2023-10-18 VITALS
SYSTOLIC BLOOD PRESSURE: 118 MMHG | OXYGEN SATURATION: 96 % | TEMPERATURE: 96.8 F | WEIGHT: 153.4 LBS | HEART RATE: 63 BPM | DIASTOLIC BLOOD PRESSURE: 68 MMHG | HEIGHT: 67 IN | BODY MASS INDEX: 24.08 KG/M2

## 2023-10-18 DIAGNOSIS — R07.81 PLEURITIC CHEST PAIN: ICD-10-CM

## 2023-10-18 DIAGNOSIS — J34.89 SINUS PRESSURE: ICD-10-CM

## 2023-10-18 DIAGNOSIS — J34.89 SINUS PAIN: ICD-10-CM

## 2023-10-18 DIAGNOSIS — R05.1 ACUTE COUGH: Primary | ICD-10-CM

## 2023-10-18 DIAGNOSIS — R05.1 ACUTE COUGH: ICD-10-CM

## 2023-10-18 DIAGNOSIS — J00 COMMON COLD VIRUS: ICD-10-CM

## 2023-10-18 LAB
SARS-COV-2 AG UPPER RESP QL IA: NEGATIVE
VALID CONTROL: NORMAL

## 2023-10-18 PROCEDURE — 71046 X-RAY EXAM CHEST 2 VIEWS: CPT

## 2023-10-18 RX ORDER — BENZONATATE 100 MG/1
100 CAPSULE ORAL 3 TIMES DAILY PRN
Qty: 21 CAPSULE | Refills: 0 | Status: SHIPPED | OUTPATIENT
Start: 2023-10-18

## 2023-10-18 RX ORDER — AMOXICILLIN AND CLAVULANATE POTASSIUM 875; 125 MG/1; MG/1
1 TABLET, FILM COATED ORAL EVERY 12 HOURS SCHEDULED
Qty: 20 TABLET | Refills: 0 | Status: SHIPPED | OUTPATIENT
Start: 2023-10-18 | End: 2023-10-28

## 2023-10-18 NOTE — PROGRESS NOTES
Assessment/Plan:    No problem-specific Assessment & Plan notes found for this encounter. Diagnoses and all orders for this visit:    Acute cough  -  PCP to trial patient on abx given productive cough, and sinus pressure x  3 weeks  Will obtain Xray to r/o CAP at this time     - XR chest pa & lateral; Future  -     benzonatate (TESSALON PERLES) 100 mg capsule; Take 1 capsule (100 mg total) by mouth 3 (three) times a day as needed for cough  -     amoxicillin-clavulanate (AUGMENTIN) 875-125 mg per tablet; Take 1 tablet by mouth every 12 (twelve) hours for 10 days    Common cold virus  -     POCT Rapid Covid Ag, neg in office    Sinus pressure  - see a/p under acute cough    Pleuritic chest pain  -     XR chest pa & lateral; Future  -     benzonatate (TESSALON PERLES) 100 mg capsule; Take 1 capsule (100 mg total) by mouth 3 (three) times a day as needed for cough  -     amoxicillin-clavulanate (AUGMENTIN) 875-125 mg per tablet; Take 1 tablet by mouth every 12 (twelve) hours for 10 days    Sinus pain  -     amoxicillin-clavulanate (AUGMENTIN) 875-125 mg per tablet; Take 1 tablet by mouth every 12 (twelve) hours for 10 days          Subjective:      Patient ID: Radha Gaffney is a 70 y.o. male. Patient reports he started viral uri symptoms, productive cough and sinus pressure/pain x 3 weeks ago  Patient denies any fevers or chills, but does complain of intermittent discomfort with cough and headaches from coughing  Patient has tried OTC decompensations and cold medicine without relief of symptoms          The following portions of the patient's history were reviewed and updated as appropriate: allergies, current medications, past family history, past medical history, past social history, past surgical history, and problem list.    Review of Systems   Constitutional:  Negative for chills and fever. Respiratory:  Positive for cough and chest tightness. Cardiovascular:  Negative for chest pain. Objective:      /68   Pulse 63   Temp (!) 96.8 °F (36 °C)   Ht 5' 6.5" (1.689 m)   Wt 69.6 kg (153 lb 6.4 oz)   SpO2 96%   BMI 24.39 kg/m²          Physical Exam  Constitutional:       Appearance: He is well-developed. HENT:      Head: Normocephalic and atraumatic. Right Ear: External ear normal.      Left Ear: External ear normal.      Nose: Nose normal.   Eyes:      Conjunctiva/sclera: Conjunctivae normal.   Cardiovascular:      Rate and Rhythm: Normal rate and regular rhythm. Heart sounds: Normal heart sounds. Pulmonary:      Effort: Pulmonary effort is normal.      Breath sounds: No rales. Abdominal:      General: Bowel sounds are normal.      Palpations: Abdomen is soft. There is no mass. Skin:     General: Skin is warm and dry. Neurological:      Mental Status: He is alert and oriented to person, place, and time.

## 2023-10-20 NOTE — RESULT ENCOUNTER NOTE
Hi ladies,    Can we please call Jet Taylor and inform him that his chest x-ray did not show any acute pulmonary disease at this time.     Thanks, Dr. Randolph Beverly

## 2023-10-23 ENCOUNTER — TELEPHONE (OUTPATIENT)
Dept: FAMILY MEDICINE CLINIC | Facility: CLINIC | Age: 78
End: 2023-10-23

## 2023-10-24 ENCOUNTER — OFFICE VISIT (OUTPATIENT)
Dept: FAMILY MEDICINE CLINIC | Facility: CLINIC | Age: 78
End: 2023-10-24
Payer: COMMERCIAL

## 2023-10-24 VITALS
OXYGEN SATURATION: 98 % | DIASTOLIC BLOOD PRESSURE: 70 MMHG | WEIGHT: 151.2 LBS | TEMPERATURE: 98.1 F | SYSTOLIC BLOOD PRESSURE: 132 MMHG | HEIGHT: 67 IN | BODY MASS INDEX: 23.73 KG/M2 | HEART RATE: 58 BPM

## 2023-10-24 DIAGNOSIS — J01.00 ACUTE NON-RECURRENT MAXILLARY SINUSITIS: Primary | ICD-10-CM

## 2023-10-24 DIAGNOSIS — R07.81 PLEURITIC CHEST PAIN: ICD-10-CM

## 2023-10-24 PROCEDURE — 99213 OFFICE O/P EST LOW 20 MIN: CPT | Performed by: FAMILY MEDICINE

## 2023-10-24 RX ORDER — BENZONATATE 100 MG/1
100 CAPSULE ORAL 3 TIMES DAILY PRN
Qty: 21 CAPSULE | Refills: 0 | Status: SHIPPED | OUTPATIENT
Start: 2023-10-24

## 2023-10-24 NOTE — PROGRESS NOTES
Assessment/Plan:    No problem-specific Assessment & Plan notes found for this encounter. Diagnoses and all orders for this visit:    Acute non-recurrent maxillary sinusitis  - Patient on day 6/10 of abx, reports symptoms are improving overall and denies any F/C since starting abx  - Continue plan of care with abx and tessalon perles    Pleuritic chest pain  -     benzonatate (TESSALON PERLES) 100 mg capsule; Take 1 capsule (100 mg total) by mouth 3 (three) times a day as needed for cough          Subjective:      Patient ID: Claude Dock is a 70 y.o. male. Patient presents for 1 week follow up of presumed bacterial sinusitis  Patient has 4 more days of antibiotics and reports that overall he is feeling better  He reports phlegm is now clear and denies any F/C since starting the abx  Patient also reports that [address] have significantly improved his cough  CXR completed on 10/18/23 did not show any acute pulmonary disease          The following portions of the patient's history were reviewed and updated as appropriate: allergies, current medications, past family history, past medical history, past social history, past surgical history, and problem list.    Review of Systems   Constitutional:  Negative for chills and fever. Respiratory:  Negative for shortness of breath. Gastrointestinal:  Negative for nausea and vomiting. Objective:      /70   Pulse 58   Temp 98.1 °F (36.7 °C)   Ht 5' 6.5" (1.689 m)   Wt 68.6 kg (151 lb 3.2 oz)   SpO2 98%   BMI 24.04 kg/m²          Physical Exam  Constitutional:       Appearance: Normal appearance. HENT:      Head: Normocephalic and atraumatic. Right Ear: External ear normal.      Left Ear: External ear normal.   Cardiovascular:      Rate and Rhythm: Normal rate and regular rhythm. Pulmonary:      Effort: Pulmonary effort is normal.      Breath sounds: Normal breath sounds. Neurological:      Mental Status: He is alert.

## 2023-11-06 ENCOUNTER — TELEPHONE (OUTPATIENT)
Dept: FAMILY MEDICINE CLINIC | Facility: CLINIC | Age: 78
End: 2023-11-06

## 2023-11-14 ENCOUNTER — TELEPHONE (OUTPATIENT)
Dept: FAMILY MEDICINE CLINIC | Facility: CLINIC | Age: 78
End: 2023-11-14

## 2023-11-14 NOTE — TELEPHONE ENCOUNTER
Message left 11-14-23 AM    Yes, this is Jamarcus Limb, Date of birth [birth year]. It's [address], my last name and I have to come in for the booster shot and I was wondering when I should schedule a date to come in. I got the flu shot and they said wait approximately 2 weeks and then call in. So if someone can call me back and I'll come in for the booster shot, I'd appreciate it. And you have a wonderful day. I appreciate the help. Thank you.  Margy.

## 2024-02-19 ENCOUNTER — TELEPHONE (OUTPATIENT)
Dept: FAMILY MEDICINE CLINIC | Facility: CLINIC | Age: 79
End: 2024-02-19

## 2024-02-19 NOTE — TELEPHONE ENCOUNTER
Attempted to contact patient . I was able to leave a message on his work voicemail and Patient's cell to call the office back so we can reschedule his AWV and see if patient needs medication refills.

## 2024-02-21 ENCOUNTER — TELEPHONE (OUTPATIENT)
Dept: FAMILY MEDICINE CLINIC | Facility: CLINIC | Age: 79
End: 2024-02-21

## 2024-02-21 ENCOUNTER — OFFICE VISIT (OUTPATIENT)
Dept: URGENT CARE | Facility: CLINIC | Age: 79
End: 2024-02-21
Payer: COMMERCIAL

## 2024-02-21 VITALS
BODY MASS INDEX: 23.11 KG/M2 | HEIGHT: 69 IN | RESPIRATION RATE: 18 BRPM | WEIGHT: 156 LBS | DIASTOLIC BLOOD PRESSURE: 75 MMHG | TEMPERATURE: 98.4 F | SYSTOLIC BLOOD PRESSURE: 165 MMHG | HEART RATE: 61 BPM | OXYGEN SATURATION: 98 %

## 2024-02-21 DIAGNOSIS — R21 RASH: Primary | ICD-10-CM

## 2024-02-21 DIAGNOSIS — K21.9 GASTROESOPHAGEAL REFLUX DISEASE WITHOUT ESOPHAGITIS: ICD-10-CM

## 2024-02-21 DIAGNOSIS — E78.2 MIXED HYPERLIPIDEMIA: ICD-10-CM

## 2024-02-21 PROCEDURE — 99213 OFFICE O/P EST LOW 20 MIN: CPT

## 2024-02-21 RX ORDER — TRIAMCINOLONE ACETONIDE 0.25 MG/G
CREAM TOPICAL 2 TIMES DAILY
Qty: 15 G | Refills: 0 | Status: SHIPPED | OUTPATIENT
Start: 2024-02-21

## 2024-02-21 RX ORDER — METHYLPREDNISOLONE 4 MG/1
TABLET ORAL
Qty: 21 TABLET | Refills: 0 | Status: SHIPPED | OUTPATIENT
Start: 2024-02-21

## 2024-02-21 NOTE — TELEPHONE ENCOUNTER
Patient called into the office stating he has a rash that I on his face and has been going on for the last two weeks.  Patient is asking if he can see Dr. Victoria.  I informed patient that there is no openings avaiable and that he should go to care now. Patient stated he will be coming up soon.

## 2024-02-21 NOTE — PROGRESS NOTES
[facility]        NAME: Bryan Cooper is a 78 y.o. male  : 1945    MRN: [id number]  DATE: 2024  TIME: 5:04 PM    Assessment and Plan   Rash [R21]  1. Rash  methylPREDNISolone 4 MG tablet therapy pack    triamcinolone (KENALOG) 0.025 % cream    Ambulatory Referral to Allergy        Diffuse rash that is located bilaterally on the face (which is the worst area), face, trunk.  It is itchy.  Started 2 weeks ago and is worsening.  Will send in Medrol Dosepak and Kenalog cream to use as needed and worse areas.  Given referral to allergy.  Advised follow-up with family doctor.  Vies with ER if any symptoms worsen.    Patient Instructions     Take prescribed medications as instructed.  May use steroid cream on areas that are worse and more itchy.  Benadryl control over-the-counter for itchiness.  Follow-up with allergy specialist as discussed.  If no improvement, follow-up with family doctor.  Go to the emergency room if any symptoms worsen such as shortness of breath or difficulty breathing, or feeling like your throat is closing.  Follow up with PCP in 3-5 days.  Proceed to  ER if symptoms worsen.    Chief Complaint     Chief Complaint   Patient presents with    Rash     Wide spread rash all over body and in groin feels like pins and needles and then gets worse when he touches it. Eats organic fish and vegetables. Symptoms started a week or two or more.          History of Present Illness       78-year-old male presents to the clinic with a widespread rash present on face, body, and groin that feels like pins-and-needles and gets worse as when he touches it.  He is allergic to nuts, iodine, sulfa, codeine, shellfish-denies being exposed to this recently. Patient states he eats organic fish and vegetables.  Denies fever, chills, difficulty breathing, feeling like his throat is closing, chest pain, shortness of breath.    Rash        Review of Systems   Review of Systems   Constitutional:  Negative.    HENT: Negative.     Respiratory: Negative.     Cardiovascular: Negative.    Musculoskeletal: Negative.    Skin:  Positive for rash.   Neurological: Negative.          Current Medications       Current Outpatient Medications:     ARTIFICIAL TEAR SOLUTION OP, Apply 2 drops to eye daily, Disp: , Rfl:     atorvastatin (LIPITOR) 10 mg tablet, Take 10 mg by mouth daily, Disp: , Rfl:     benzonatate (TESSALON PERLES) 100 mg capsule, Take 1 capsule (100 mg total) by mouth 3 (three) times a day as needed for cough, Disp: 21 capsule, Rfl: 0    Biotin 1000 MCG tablet, Take 2,500 mcg by mouth 3 (three) times a week, Disp: , Rfl:     co-enzyme Q-10 100 mg capsule, Take 100 mg by mouth in the morning, Disp: , Rfl:     Elastic Bandages & Supports (Abdominal Binder/Elastic Small) MISC, Use in the morning, Disp: 1 each, Rfl: 0    Icosapent Ethyl 1 g CAPS, Take 1 capsule (1 g total) by mouth 2 (two) times a day, Disp: 180 capsule, Rfl: 1    levothyroxine 50 mcg tablet, Take 1 tablet (50 mcg total) by mouth daily, Disp: 90 tablet, Rfl: 1    methylPREDNISolone 4 MG tablet therapy pack, Use as directed on package, Disp: 21 tablet, Rfl: 0    omeprazole (PriLOSEC) 10 mg delayed release capsule, Take 1 capsule (10 mg total) by mouth daily, Disp: 90 capsule, Rfl: 1    polyethylene glycol (MIRALAX) 17 g packet, Take 17 g by mouth daily, Disp: , Rfl:     therapeutic multivitamin-minerals (THERAGRAN-M) tablet, Take 1 tablet by mouth daily, Disp: , Rfl:     triamcinolone (KENALOG) 0.025 % cream, Apply topically 2 (two) times a day, Disp: 15 g, Rfl: 0    Current Allergies     Allergies as of 02/21/2024 - Reviewed 02/21/2024   Allergen Reaction Noted    Iodine - food allergy Hives, Anaphylaxis, and Rash 01/11/2022    Nuts - food allergy Itching 03/11/2022    Sulfa antibiotics Hives and Rash 02/25/2008    Codeine Abdominal Pain and Other (See Comments) 07/15/2009    Shellfish allergy - food allergy Rash 02/25/2008    Iodinated  "contrast media Rash 01/07/2022            The following portions of the patient's history were reviewed and updated as appropriate: allergies, current medications, past family history, past medical history, past social history, past surgical history and problem list.     Past Medical History:   Diagnosis Date    Allergic     Disease of thyroid gland        Past Surgical History:   Procedure Laterality Date    PROSTATE SURGERY  2022    SMALL INTESTINE SURGERY N/A     STOMACH SURGERY  1998       Family History   Problem Relation Age of Onset    Heart attack Mother     Heart attack Father     Hypertension Father     Heart attack Sister     Heart attack Brother          Medications have been verified.        Objective   /75   Pulse 61   Temp 98.4 °F (36.9 °C)   Resp 18   Ht 5' 9\" (1.753 m)   Wt 70.8 kg (156 lb)   SpO2 98%   BMI 23.04 kg/m²        Physical Exam     Physical Exam  Constitutional:       General: He is not in acute distress.     Appearance: Normal appearance. He is not ill-appearing, toxic-appearing or diaphoretic.   HENT:      Head: Normocephalic and atraumatic.      Mouth/Throat:      Mouth: Mucous membranes are moist.      Pharynx: Oropharynx is clear. No oropharyngeal exudate or posterior oropharyngeal erythema.   Eyes:      Extraocular Movements: Extraocular movements intact.      Pupils: Pupils are equal, round, and reactive to light.   Cardiovascular:      Rate and Rhythm: Normal rate and regular rhythm.      Pulses: Normal pulses.      Heart sounds: Normal heart sounds.   Pulmonary:      Effort: Pulmonary effort is normal. No respiratory distress.      Breath sounds: Normal breath sounds. No stridor. No wheezing, rhonchi or rales.   Chest:      Chest wall: No tenderness.   Musculoskeletal:      Cervical back: Normal range of motion and neck supple. No rigidity or tenderness.   Lymphadenopathy:      Cervical: No cervical adenopathy.   Skin:     General: Skin is warm and dry.      " Capillary Refill: Capillary refill takes less than 2 seconds.      Findings: Rash (Diffuse erythematous slightly raised/urticarial rash present across the face and upper extremities.  Also present on trunk and back.  Less prominent from the neck down.  No open wounds or drainage.) present.   Neurological:      General: No focal deficit present.      Mental Status: He is alert and oriented to person, place, and time. Mental status is at baseline.   Psychiatric:         Mood and Affect: Mood normal.         Behavior: Behavior normal.

## 2024-02-21 NOTE — PATIENT INSTRUCTIONS
Take prescribed medications as instructed.  May use steroid cream on areas that are worse and more itchy.  Benadryl control over-the-counter for itchiness.  Follow-up with allergy specialist as discussed.  If no improvement, follow-up with family doctor.  Go to the emergency room if any symptoms worsen such as shortness of breath or difficulty breathing, or feeling like your throat is closing.  Follow up with PCP in 3-5 days.  Proceed to  ER if symptoms worsen.  Acute Rash   WHAT YOU NEED TO KNOW:   A rash is irritated, red, or itchy skin or mucus membranes, such as the lining of your nose or throat. Acute means the rash starts suddenly, worsens quickly, and lasts a short time. Common causes include a disease or infection, a reaction to something you are allergic to, or certain medicines.  DISCHARGE INSTRUCTIONS:   Return to the emergency department if:   You have sudden trouble breathing or chest pain.    You are vomiting, have a headache or muscle aches, and your throat hurts.    Call your doctor or dermatologist if:   You have a fever.    You get open wounds from scratching your skin, or you have a wound that is red, swollen, or painful.    Your rash lasts longer than 3 months.    You have swelling or pain in your joints.    You have questions or concerns about your condition or care.    Medicines:  If your rash does not go away on its own, you may need the following medicines:  Antihistamines  may be given to help decrease itching.    Steroids  may be given to decrease inflammation.    Antibiotics  help fight or prevent a bacterial infection.    Take your medicine as directed.  Contact your healthcare provider if you think your medicine is not helping or if you have side effects. Tell your provider if you are allergic to any medicine. Keep a list of the medicines, vitamins, and herbs you take. Include the amounts, and when and why you take them. Bring the list or the pill bottles to follow-up visits. Carry your  medicine list with you in case of an emergency.    Prevent a rash or care for your skin when you have a rash:  Dry skin can lead to more problems. Do not scratch your skin if it itches. You may cause a skin infection by scratching. The following may prevent dry skin, and help your skin look better:  Help soothe your rash.  Apply thick cream lotions or petroleum jelly. Cool compresses may also soothe your skin. Apply a cool compress or a cool, wet towel, and then cover it with a dry towel.    Use lukewarm water when you bathe.  Hot water may damage your skin more. Pat your skin dry. Do not rub your skin with a towel.    Use detergents, soaps, shampoos, and bubble baths  made for sensitive skin.    Wear clothes made of cotton instead of nylon or wool.  Cotton is softer, so it will not hurt your skin as much.    Follow up with your healthcare providers as directed:  A dermatologist may help find the cause of your rash or help plan or change treatment. A dietitian may help with meal planning if you have a food allergy. Write down your questions so you remember to ask them during your visits.  © Copyright [employer] 2023 Information is for End User's use only and may not be sold, redistributed or otherwise used for commercial purposes.  The above information is an  only. It is not intended as medical advice for individual conditions or treatments. Talk to your doctor, nurse or pharmacist before following any medical regimen to see if it is safe and effective for you.

## 2024-02-22 DIAGNOSIS — K21.9 GASTROESOPHAGEAL REFLUX DISEASE WITHOUT ESOPHAGITIS: ICD-10-CM

## 2024-02-22 DIAGNOSIS — E78.2 MIXED HYPERLIPIDEMIA: ICD-10-CM

## 2024-02-22 RX ORDER — ICOSAPENT ETHYL 1000 MG/1
1 CAPSULE ORAL 2 TIMES DAILY
Qty: 180 CAPSULE | Refills: 1 | OUTPATIENT
Start: 2024-02-22

## 2024-02-22 RX ORDER — OMEPRAZOLE 10 MG/1
10 CAPSULE, DELAYED RELEASE ORAL DAILY
Qty: 90 CAPSULE | Refills: 1 | OUTPATIENT
Start: 2024-02-22

## 2024-02-22 NOTE — TELEPHONE ENCOUNTER
I contacted patient to see if he needs medication refill and he did address that he needs omeprazole and his lcosapent.  I also asked how the rash is doing.  Patient stated that he has to  the medication that care now prescribed.  He would like to see Dr Victoria.  I asked that he gives the medication at least a week and if he still is having issues then to call the office and we can get him in to see Dr Victoria.      Upon looking in the Care now not it adresses patient to see an allergist and /or to follow up with PCP in 3to5 days?  What would you like me to address to Patient?

## 2024-02-25 RX ORDER — ICOSAPENT ETHYL 1000 MG/1
1 CAPSULE ORAL 2 TIMES DAILY
Qty: 180 CAPSULE | Refills: 1 | Status: SHIPPED | OUTPATIENT
Start: 2024-02-25

## 2024-02-25 RX ORDER — OMEPRAZOLE 10 MG/1
10 CAPSULE, DELAYED RELEASE ORAL DAILY
Qty: 90 CAPSULE | Refills: 1 | Status: SHIPPED | OUTPATIENT
Start: 2024-02-25

## 2024-04-12 ENCOUNTER — APPOINTMENT (OUTPATIENT)
Dept: LAB | Facility: CLINIC | Age: 79
End: 2024-04-12
Payer: COMMERCIAL

## 2024-04-12 ENCOUNTER — TELEPHONE (OUTPATIENT)
Dept: FAMILY MEDICINE CLINIC | Facility: CLINIC | Age: 79
End: 2024-04-12

## 2024-04-12 ENCOUNTER — OFFICE VISIT (OUTPATIENT)
Dept: FAMILY MEDICINE CLINIC | Facility: CLINIC | Age: 79
End: 2024-04-12

## 2024-04-12 VITALS
TEMPERATURE: 95.4 F | DIASTOLIC BLOOD PRESSURE: 88 MMHG | WEIGHT: 150.4 LBS | OXYGEN SATURATION: 98 % | HEART RATE: 78 BPM | SYSTOLIC BLOOD PRESSURE: 130 MMHG | HEIGHT: 69 IN | BODY MASS INDEX: 22.28 KG/M2

## 2024-04-12 DIAGNOSIS — R53.1 WEAKNESS: ICD-10-CM

## 2024-04-12 DIAGNOSIS — R53.83 OTHER FATIGUE: ICD-10-CM

## 2024-04-12 DIAGNOSIS — D37.9 GASTRINOMA: ICD-10-CM

## 2024-04-12 DIAGNOSIS — E74.818 OTHER DISORDERS OF GLUCOSE TRANSPORT (HCC): ICD-10-CM

## 2024-04-12 DIAGNOSIS — E83.59 OTHER DISORDERS OF CALCIUM METABOLISM: ICD-10-CM

## 2024-04-12 DIAGNOSIS — Z00.00 MEDICARE ANNUAL WELLNESS VISIT, SUBSEQUENT: Primary | ICD-10-CM

## 2024-04-12 DIAGNOSIS — E78.2 MIXED HYPERLIPIDEMIA: ICD-10-CM

## 2024-04-12 DIAGNOSIS — J40 BRONCHITIS: ICD-10-CM

## 2024-04-12 DIAGNOSIS — K21.9 GASTROESOPHAGEAL REFLUX DISEASE WITHOUT ESOPHAGITIS: ICD-10-CM

## 2024-04-12 DIAGNOSIS — E03.9 ACQUIRED HYPOTHYROIDISM: ICD-10-CM

## 2024-04-12 LAB
25(OH)D3 SERPL-MCNC: 30.6 NG/ML (ref 30–100)
ALBUMIN SERPL BCP-MCNC: 4.4 G/DL (ref 3.5–5)
ALP SERPL-CCNC: 89 U/L (ref 34–104)
ALT SERPL W P-5'-P-CCNC: 21 U/L (ref 7–52)
ANION GAP SERPL CALCULATED.3IONS-SCNC: 8 MMOL/L (ref 4–13)
AST SERPL W P-5'-P-CCNC: 23 U/L (ref 13–39)
BASOPHILS # BLD AUTO: 0.03 THOUSANDS/ÂΜL (ref 0–0.1)
BASOPHILS NFR BLD AUTO: 0 % (ref 0–1)
BILIRUB SERPL-MCNC: 0.64 MG/DL (ref 0.2–1)
BUN SERPL-MCNC: 22 MG/DL (ref 5–25)
CALCIUM SERPL-MCNC: 10 MG/DL (ref 8.4–10.2)
CHLORIDE SERPL-SCNC: 104 MMOL/L (ref 96–108)
CHOLEST SERPL-MCNC: 208 MG/DL
CO2 SERPL-SCNC: 29 MMOL/L (ref 21–32)
CREAT SERPL-MCNC: 0.83 MG/DL (ref 0.6–1.3)
EOSINOPHIL # BLD AUTO: 0.15 THOUSAND/ÂΜL (ref 0–0.61)
EOSINOPHIL NFR BLD AUTO: 2 % (ref 0–6)
ERYTHROCYTE [DISTWIDTH] IN BLOOD BY AUTOMATED COUNT: 14.6 % (ref 11.6–15.1)
EST. AVERAGE GLUCOSE BLD GHB EST-MCNC: 120 MG/DL
FERRITIN SERPL-MCNC: 26 NG/ML (ref 24–336)
GFR SERPL CREATININE-BSD FRML MDRD: 85 ML/MIN/1.73SQ M
GLUCOSE P FAST SERPL-MCNC: 92 MG/DL (ref 65–99)
HBA1C MFR BLD: 5.8 %
HCT VFR BLD AUTO: 48.5 % (ref 36.5–49.3)
HDLC SERPL-MCNC: 47 MG/DL
HGB BLD-MCNC: 15.8 G/DL (ref 12–17)
IMM GRANULOCYTES # BLD AUTO: 0.01 THOUSAND/UL (ref 0–0.2)
IMM GRANULOCYTES NFR BLD AUTO: 0 % (ref 0–2)
IRON SATN MFR SERPL: 17 % (ref 15–50)
IRON SERPL-MCNC: 59 UG/DL (ref 50–212)
LDLC SERPL CALC-MCNC: 141 MG/DL (ref 0–100)
LYMPHOCYTES # BLD AUTO: 1.05 THOUSANDS/ÂΜL (ref 0.6–4.47)
LYMPHOCYTES NFR BLD AUTO: 16 % (ref 14–44)
MCH RBC QN AUTO: 29.6 PG (ref 26.8–34.3)
MCHC RBC AUTO-ENTMCNC: 32.6 G/DL (ref 31.4–37.4)
MCV RBC AUTO: 91 FL (ref 82–98)
MONOCYTES # BLD AUTO: 0.68 THOUSAND/ÂΜL (ref 0.17–1.22)
MONOCYTES NFR BLD AUTO: 10 % (ref 4–12)
NEUTROPHILS # BLD AUTO: 4.86 THOUSANDS/ÂΜL (ref 1.85–7.62)
NEUTS SEG NFR BLD AUTO: 72 % (ref 43–75)
NONHDLC SERPL-MCNC: 161 MG/DL
NRBC BLD AUTO-RTO: 0 /100 WBCS
PLATELET # BLD AUTO: 173 THOUSANDS/UL (ref 149–390)
PMV BLD AUTO: 10.7 FL (ref 8.9–12.7)
POTASSIUM SERPL-SCNC: 4.8 MMOL/L (ref 3.5–5.3)
PROT SERPL-MCNC: 6.9 G/DL (ref 6.4–8.4)
RBC # BLD AUTO: 5.34 MILLION/UL (ref 3.88–5.62)
SODIUM SERPL-SCNC: 141 MMOL/L (ref 135–147)
T4 FREE SERPL-MCNC: 0.7 NG/DL (ref 0.61–1.12)
TIBC SERPL-MCNC: 356 UG/DL (ref 250–450)
TRIGL SERPL-MCNC: 99 MG/DL
TSH SERPL DL<=0.05 MIU/L-ACNC: 6.42 UIU/ML (ref 0.45–4.5)
UIBC SERPL-MCNC: 297 UG/DL (ref 155–355)
WBC # BLD AUTO: 6.78 THOUSAND/UL (ref 4.31–10.16)

## 2024-04-12 PROCEDURE — 80053 COMPREHEN METABOLIC PANEL: CPT

## 2024-04-12 PROCEDURE — 80061 LIPID PANEL: CPT

## 2024-04-12 PROCEDURE — 84439 ASSAY OF FREE THYROXINE: CPT

## 2024-04-12 PROCEDURE — 82306 VITAMIN D 25 HYDROXY: CPT

## 2024-04-12 PROCEDURE — 83036 HEMOGLOBIN GLYCOSYLATED A1C: CPT

## 2024-04-12 PROCEDURE — 82728 ASSAY OF FERRITIN: CPT

## 2024-04-12 PROCEDURE — 84443 ASSAY THYROID STIM HORMONE: CPT

## 2024-04-12 PROCEDURE — 83540 ASSAY OF IRON: CPT

## 2024-04-12 PROCEDURE — 83550 IRON BINDING TEST: CPT

## 2024-04-12 PROCEDURE — 36415 COLL VENOUS BLD VENIPUNCTURE: CPT

## 2024-04-12 PROCEDURE — 85025 COMPLETE CBC W/AUTO DIFF WBC: CPT

## 2024-04-12 RX ORDER — LINACLOTIDE 72 UG/1
72 CAPSULE, GELATIN COATED ORAL DAILY
COMMUNITY
Start: 2024-01-23 | End: 2025-01-22

## 2024-04-12 RX ORDER — AZITHROMYCIN 250 MG/1
TABLET, FILM COATED ORAL DAILY
Qty: 6 TABLET | Refills: 0 | Status: SHIPPED | OUTPATIENT
Start: 2024-04-12 | End: 2024-04-17

## 2024-04-12 RX ORDER — LEVOTHYROXINE SODIUM 0.05 MG/1
50 TABLET ORAL DAILY
Qty: 90 TABLET | Refills: 1 | Status: SHIPPED | OUTPATIENT
Start: 2024-04-12

## 2024-04-12 RX ORDER — LOTILANER OPHTHALMIC SOLUTION 2.5 MG/ML
SOLUTION/ DROPS OPHTHALMIC
COMMUNITY
Start: 2024-02-22

## 2024-04-12 NOTE — TELEPHONE ENCOUNTER
Called [facility] in [place], spoke with Rosario. She informed us that patient is getting Pterygium Excision. Patient is not yet scheduled with them. We called to get information if a pre op form needed to be filled out. She will contact the surgery team and give us a call back.

## 2024-04-12 NOTE — PATIENT INSTRUCTIONS
Medicare Preventive Visit Patient Instructions  Thank you for completing your Welcome to Medicare Visit or Medicare Annual Wellness Visit today. Your next wellness visit will be due in one year (4/13/2025).  The screening/preventive services that you may require over the next 5-10 years are detailed below. Some tests may not apply to you based off risk factors and/or age. Screening tests ordered at today's visit but not completed yet may show as past due. Also, please note that scanned in results may not display below.  Preventive Screenings:  Service Recommendations Previous Testing/Comments   Colorectal Cancer Screening  Colonoscopy    Fecal Occult Blood Test (FOBT)/Fecal Immunochemical Test (FIT)  Fecal DNA/Cologuard Test  Flexible Sigmoidoscopy Age: 45-75 years old   Colonoscopy: every 10 years (May be performed more frequently if at higher risk)  OR  FOBT/FIT: every 1 year  OR  Cologuard: every 3 years  OR  Sigmoidoscopy: every 5 years  Screening may be recommended earlier than age 45 if at higher risk for colorectal cancer. Also, an individualized decision between you and your healthcare provider will decide whether screening between the ages of 76-85 would be appropriate. Colonoscopy: 06/02/2020  FOBT/FIT: Not on file  Cologuard: Not on file  Sigmoidoscopy: Not on file          Prostate Cancer Screening Individualized decision between patient and health care provider in men between ages of 55-69   Medicare will cover every 12 months beginning on the day after your 50th birthday PSA: <0.1 ng/mL     History Prostate Cancer  Screening Not Indicated     Hepatitis C Screening Once for adults born between 1945 and 1965  More frequently in patients at high risk for Hepatitis C Hep C Antibody: 01/19/2023    Screening Current   Diabetes Screening 1-2 times per year if you're at risk for diabetes or have pre-diabetes Fasting glucose: 89 mg/dL (5/18/2023)  A1C: 5.7 % (5/18/2023)  Screening Current   Cholesterol  Screening Once every 5 years if you don't have a lipid disorder. May order more often based on risk factors. Lipid panel: 05/18/2023  Screening Not Indicated  History Lipid Disorder      Other Preventive Screenings Covered by Medicare:  Abdominal Aortic Aneurysm (AAA) Screening: covered once if your at risk. You're considered to be at risk if you have a family history of AAA or a male between the age of 65-75 who smoking at least 100 cigarettes in your lifetime.  Lung Cancer Screening: covers low dose CT scan once per year if you meet all of the following conditions: (1) Age 55-77; (2) No signs or symptoms of lung cancer; (3) Current smoker or have quit smoking within the last 15 years; (4) You have a tobacco smoking history of at least 20 pack years (packs per day x number of years you smoked); (5) You get a written order from a healthcare provider.  Glaucoma Screening: covered annually if you're considered high risk: (1) You have diabetes OR (2) Family history of glaucoma OR (3)  aged 50 and older OR (4)  American aged 65 and older  Osteoporosis Screening: covered every 2 years if you meet one of the following conditions: (1) Have a vertebral abnormality; (2) On glucocorticoid therapy for more than 3 months; (3) Have primary hyperparathyroidism; (4) On osteoporosis medications and need to assess response to drug therapy.  HIV Screening: covered annually if you're between the age of 15-65. Also covered annually if you are younger than 15 and older than 65 with risk factors for HIV infection. For pregnant patients, it is covered up to 3 times per pregnancy.    Immunizations:  Immunization Recommendations   Influenza Vaccine Annual influenza vaccination during flu season is recommended for all persons aged >= 6 months who do not have contraindications   Pneumococcal Vaccine   * Pneumococcal conjugate vaccine = PCV13 (Prevnar 13), PCV15 (Vaxneuvance), PCV20 (Prevnar 20)  * Pneumococcal  polysaccharide vaccine = PPSV23 (Pneumovax) Adults 19-63 yo with certain risk factors or if 65+ yo  If never received any pneumonia vaccine: recommend Prevnar 20 (PCV20)  Give PCV20 if previously received 1 dose of PCV13 or PPSV23   Hepatitis B Vaccine 3 dose series if at intermediate or high risk (ex: diabetes, end stage renal disease, liver disease)   Respiratory syncytial virus (RSV) Vaccine - COVERED BY MEDICARE PART D  * RSVPreF3 (Arexvy) CDC recommends that adults 60 years of age and older may receive a single dose of RSV vaccine using shared clinical decision-making (SCDM)   Tetanus (Td) Vaccine - COST NOT COVERED BY MEDICARE PART B Following completion of primary series, a booster dose should be given every 10 years to maintain immunity against tetanus. Td may also be given as tetanus wound prophylaxis.   Tdap Vaccine - COST NOT COVERED BY MEDICARE PART B Recommended at least once for all adults. For pregnant patients, recommended with each pregnancy.   Shingles Vaccine (Shingrix) - COST NOT COVERED BY MEDICARE PART B  2 shot series recommended in those 19 years and older who have or will have weakened immune systems or those 50 years and older     Health Maintenance Due:      Topic Date Due   • Hepatitis C Screening  Completed   • Colorectal Cancer Screening  Discontinued     Immunizations Due:      Topic Date Due   • COVID-19 Vaccine (5 - 2023-24 season) 01/12/2024     Advance Directives   What are advance directives?  Advance directives are legal documents that state your wishes and plans for medical care. These plans are made ahead of time in case you lose your ability to make decisions for yourself. Advance directives can apply to any medical decision, such as the treatments you want, and if you want to donate organs.   What are the types of advance directives?  There are many types of advance directives, and each state has rules about how to use them. You may choose a combination of any of the  following:  Living will:  This is a written record of the treatment you want. You can also choose which treatments you do not want, which to limit, and which to stop at a certain time. This includes surgery, medicine, IV fluid, and tube feedings.   Durable power of  for healthcare (DPAHC):  This is a written record that states who you want to make healthcare choices for you when you are unable to make them for yourself. This person, called a proxy, is usually a family member or a friend. You may choose more than 1 proxy.  Do not resuscitate (DNR) order:  A DNR order is used in case your heart stops beating or you stop breathing. It is a request not to have certain forms of treatment, such as CPR. A DNR order may be included in other types of advance directives.  Medical directive:  This covers the care that you want if you are in a coma, near death, or unable to make decisions for yourself. You can list the treatments you want for each condition. Treatment may include pain medicine, surgery, blood transfusions, dialysis, IV or tube feedings, and a ventilator (breathing machine).  Values history:  This document has questions about your views, beliefs, and how you feel and think about life. This information can help others choose the care that you would choose.  Why are advance directives important?  An advance directive helps you control your care. Although spoken wishes may be used, it is better to have your wishes written down. Spoken wishes can be misunderstood, or not followed. Treatments may be given even if you do not want them. An advance directive may make it easier for your family to make difficult choices about your care.       © Copyright [employer] 2018 Information is for End User's use only and may not be sold, redistributed or otherwise used for commercial purposes. All illustrations and images included in CareNotes® are the copyrighted property of [employer] or [employer]

## 2024-04-12 NOTE — PROGRESS NOTES
Assessment and Plan:     Problem List Items Addressed This Visit       GERD (gastroesophageal reflux disease)    Relevant Orders    CBC and differential    Comprehensive metabolic panel    Hyperlipidemia    Relevant Orders    Lipid panel    Hemoglobin A1C    Hypothyroidism    Relevant Medications    levothyroxine 50 mcg tablet    Other Relevant Orders    T4, free    TSH, 3rd generation     Other Visit Diagnoses       Medicare annual wellness visit, subsequent    -  Primary    Other fatigue        Relevant Orders    Iron Panel (Includes Ferritin, Iron Sat%, Iron, and TIBC)    Vitamin D 25 hydroxy    Weakness        Relevant Orders    Iron Panel (Includes Ferritin, Iron Sat%, Iron, and TIBC)    Vitamin D 25 hydroxy    Other disorders of glucose transport (HCC)        Relevant Orders    Hemoglobin A1C    Gastrinoma        Relevant Orders    Iron Panel (Includes Ferritin, Iron Sat%, Iron, and TIBC)    Other disorders of calcium metabolism        Relevant Orders    Vitamin D 25 hydroxy    Bronchitis        Relevant Medications    azithromycin (Zithromax) 250 mg tablet            Depression Screening and Follow-up Plan: Patient was screened for depression during today's encounter. They screened negative with a PHQ-2 score of 0.      Preventive health issues were discussed with patient, and age appropriate screening tests were ordered as noted in patient's After Visit Summary.  Personalized health advice and appropriate referrals for health education or preventive services given if needed, as noted in patient's After Visit Summary.     History of Present Illness:     Patient presents for a Medicare Wellness Visit    Patient reports that he has been doing about one third of the MiraLAX dose and has not yet started Linzess  He was seen back on 1/2024 with gastroenterology and was [facility] in [place] and at that time was told that his symptoms were likely due to underlying chronic constipation and told to start Linzess 72  [facility] [place]  Patient reports that he is doing well on MiraLAX and does not feel he needs to take Linzess at this time  Patient additionally reports to PCP that he has been following up with his ophthalmologist in [place] who reports he needs cornea surgery  PCP to obtain further information from [place] office Dr. Villatoro at [facility] where surgery is being planned  Patient reports that date has not been given as ophthalmologist was requesting cardiac clearance and preop clearance from PCP       Patient Care Team:  Kate Carlson MD as PCP - General (Family Medicine)     Review of Systems:     Review of Systems   Respiratory:  Negative for chest tightness.    Gastrointestinal:  Negative for nausea and vomiting.   Genitourinary:  Negative for difficulty urinating.        Problem List:     Patient Active Problem List   Diagnosis    GERD (gastroesophageal reflux disease)    History of gastrointestinal stromal tumor (GIST)    Hyperlipidemia    Hypothyroidism    Partial small bowel obstruction (HCC)    Prostate cancer (HCC)    Multinodular goiter    Malignant gastrointestinal stromal tumor, unspecified site (HCC)    Diastasis recti    Abdominal discomfort      Past Medical and Surgical History:     Past Medical History:   Diagnosis Date    Allergic     Disease of thyroid gland      Past Surgical History:   Procedure Laterality Date    PROSTATE SURGERY  2022    SMALL INTESTINE SURGERY N/A     STOMACH SURGERY  1998      Family History:     Family History   Problem Relation Age of Onset    Heart attack Mother     Heart attack Father     Hypertension Father     Heart attack Sister     Heart attack Brother       Social History:     Social History     Socioeconomic History    Marital status: Single     Spouse name: None    Number of children: None    Years of education: None    Highest education level: None   Occupational History    None   Tobacco Use    Smoking status: Never     Passive exposure: Never     Smokeless tobacco: Never   Vaping Use    Vaping status: Never Used   Substance and Sexual Activity    Alcohol use: Not Currently     Alcohol/week: 1.0 standard drink of alcohol     Types: 1 Glasses of wine per week     Comment: occassional    Drug use: Never    Sexual activity: None   Other Topics Concern    None   Social History Narrative    None     Social Determinants of Health     Financial Resource Strain: Low Risk  (1/19/2023)    Overall Financial Resource Strain (CARDIA)     Difficulty of Paying Living Expenses: Not hard at all   Food Insecurity: No Food Insecurity (4/12/2024)    Hunger Vital Sign     Worried About Running Out of Food in the Last Year: Never true     Ran Out of Food in the Last Year: Never true   Transportation Needs: No Transportation Needs (4/12/2024)    PRAPARE - Transportation     Lack of Transportation (Medical): No     Lack of Transportation (Non-Medical): No   Physical Activity: Not on file   Stress: Not on file   Social Connections: Not on file   Intimate Partner Violence: Not on file   Housing Stability: Unknown (4/12/2024)    Housing Stability Vital Sign     Unable to Pay for Housing in the Last Year: No     Number of Places Lived in the Last Year: Not on file     Unstable Housing in the Last Year: No      Medications and Allergies:     Current Outpatient Medications   Medication Sig Dispense Refill    ARTIFICIAL TEAR SOLUTION OP Apply 2 drops to eye daily      atorvastatin (LIPITOR) 10 mg tablet Take 10 mg by mouth daily      azithromycin (Zithromax) 250 mg tablet Take 2 tablets (500 mg total) by mouth daily for 1 day, THEN 1 tablet (250 mg total) daily for 4 days. 6 tablet 0    Biotin 1000 MCG tablet Take 2,500 mcg by mouth 3 (three) times a week      co-enzyme Q-10 100 mg capsule Take 100 mg by mouth in the morning      Elastic Bandages & Supports (Abdominal Binder/Elastic Small) MISC Use in the morning 1 each 0    Icosapent Ethyl 1 g CAPS Take 1 capsule (1 g total) by mouth 2  (two) times a day 180 capsule 1    levothyroxine 50 mcg tablet Take 1 tablet (50 mcg total) by mouth daily 90 tablet 1    Linzess 72 MCG CAPS Take 72 mcg by mouth daily      omeprazole (PriLOSEC) 10 mg delayed release capsule Take 1 capsule (10 mg total) by mouth daily 90 capsule 1    polyethylene glycol (MIRALAX) 17 g packet Take 17 g by mouth daily      therapeutic multivitamin-minerals (THERAGRAN-M) tablet Take 1 tablet by mouth daily      Xdemvy 0.25 % SOLN        No current facility-administered medications for this visit.     Allergies   Allergen Reactions    Iodine - Food Allergy Hives, Anaphylaxis and Rash     CT dye  CT dye      Nuts - Food Allergy Itching     Tongue irritation and pruritis.  Itching of lips and tongue      Sulfa Antibiotics Hives and Rash    Codeine Abdominal Pain and Other (See Comments)     Stomach pain      Shellfish Allergy - Food Allergy Rash    Iodinated Contrast Media Rash      Immunizations:     Immunization History   Administered Date(s) Administered    COVID-19 MODERNA VACC 0.5 ML IM 04/02/2021, 04/30/2021, 11/01/2021    COVID-19 Pfizer mRNA vacc PF uri-sucrose 12 yr and older (Comirnaty) 11/17/2023    INFLUENZA 12/09/2022    Influenza, Seasonal Vaccine, Quadrivalent, Adjuvanted, .5e 09/13/2023    Influenza, high dose seasonal 0.7 mL 12/09/2022, 11/07/2023    Pneumococcal Conjugate Vaccine 20-valent (Pcv20), Polysace 01/19/2023      Health Maintenance:         Topic Date Due    Hepatitis C Screening  Completed    Colorectal Cancer Screening  Discontinued         Topic Date Due    COVID-19 Vaccine (5 - 2023-24 season) 01/12/2024      Medicare Screening Tests and Risk Assessments:     Bryan is here for his Subsequent Wellness visit.     Health Risk Assessment:   Patient rates overall health as good. Patient feels that their physical health rating is slightly worse. Patient is dissatisfied with their life. Eyesight was rated as slightly worse. Hearing was rated as slightly worse.  Patient feels that their emotional and mental health rating is same. Patients states they are never, rarely angry. Patient states they are always unusually tired/fatigued. Pain experienced in the last 7 days has been none. Patient states that he has experienced no weight loss or gain in last 6 months.     Depression Screening:   PHQ-2 Score: 0      Fall Risk Screening:   In the past year, patient has experienced: no history of falling in past year      Home Safety:  Patient does not have trouble with stairs inside or outside of their home. Patient has working smoke alarms and has working carbon monoxide detector. Home safety hazards include: none.     Nutrition:   Current diet is Low Cholesterol and Low Carb.     Medications:   Patient is currently taking over-the-counter supplements. OTC medications include: see medication list. Patient is able to manage medications.     Activities of Daily Living (ADLs)/Instrumental Activities of Daily Living (IADLs):   Walk and transfer into and out of bed and chair?: Yes  Dress and groom yourself?: Yes    Bathe or shower yourself?: Yes    Feed yourself? Yes  Do your laundry/housekeeping?: Yes  Manage your money, pay your bills and track your expenses?: Yes  Make your own meals?: Yes    Do your own shopping?: Yes    Previous Hospitalizations:   Any hospitalizations or ED visits within the last 12 months?: Yes      Hospitalization Comments: Surgeries only    Advance Care Planning:   Living will: No    Durable POA for healthcare: No    Advanced directive: No      PREVENTIVE SCREENINGS      Cardiovascular Screening:    General: Screening Not Indicated, History Lipid Disorder and Risks and Benefits Discussed      Diabetes Screening:     General: Screening Current and Risks and Benefits Discussed      Colorectal Cancer Screening:     General: Risks and Benefits Discussed      Prostate Cancer Screening:    General: History Prostate Cancer and Screening Not Indicated      Lung Cancer  "Screening:     General: Screening Not Indicated      Hepatitis C Screening:    General: Screening Current    Screening, Brief Intervention, and Referral to Treatment (SBIRT)    Screening  Typical number of drinks in a day: 0  Typical number of drinks in a week: 0  Interpretation: Low risk drinking behavior.    AUDIT-C Screenin) How often did you have a drink containing alcohol in the past year? never  2) How many drinks did you have on a typical day when you were drinking in the past year? 0  3) How often did you have 6 or more drinks on one occasion in the past year? never    AUDIT-C Score: 0  Interpretation: Score 0-3 (male): Negative screen for alcohol misuse    Single Item Drug Screening:  How often have you used an illegal drug (including marijuana) or a prescription medication for non-medical reasons in the past year? never    Single Item Drug Screen Score: 0  Interpretation: Negative screen for possible drug use disorder    No results found.     Physical Exam:     /88   Pulse 78   Temp (!) 95.4 °F (35.2 °C)   Ht 5' 9\" (1.753 m)   Wt 68.2 kg (150 lb 6.4 oz)   SpO2 98%   BMI 22.21 kg/m²     Physical Exam  Vitals reviewed.   Constitutional:       Appearance: Normal appearance.   HENT:      Head: Normocephalic and atraumatic.      Right Ear: External ear normal.      Left Ear: External ear normal.      Nose: Nose normal.      Mouth/Throat:      Mouth: Mucous membranes are moist.      Pharynx: Oropharynx is clear.   Eyes:      Conjunctiva/sclera: Conjunctivae normal.   Cardiovascular:      Rate and Rhythm: Normal rate and regular rhythm.      Pulses: Normal pulses.   Pulmonary:      Effort: Pulmonary effort is normal.      Breath sounds: Normal breath sounds.   Abdominal:      General: Bowel sounds are normal.      Palpations: Abdomen is soft.      Tenderness: There is no abdominal tenderness.   Musculoskeletal:         General: Normal range of motion.      Cervical back: Neck supple.   Skin:     " General: Skin is warm and dry.   Neurological:      Mental Status: He is alert. Mental status is at baseline.   Psychiatric:         Mood and Affect: Mood normal.          Kate Carlson MD

## 2024-04-17 ENCOUNTER — TELEPHONE (OUTPATIENT)
Dept: FAMILY MEDICINE CLINIC | Facility: CLINIC | Age: 79
End: 2024-04-17

## 2024-04-17 DIAGNOSIS — R94.6 ABNORMAL THYROID FUNCTION TEST: Primary | ICD-10-CM

## 2024-04-17 DIAGNOSIS — E78.2 MIXED HYPERLIPIDEMIA: ICD-10-CM

## 2024-04-17 RX ORDER — ATORVASTATIN CALCIUM 40 MG/1
40 TABLET, FILM COATED ORAL DAILY
Qty: 60 TABLET | Refills: 0 | Status: SHIPPED | OUTPATIENT
Start: 2024-04-17 | End: 2024-04-17

## 2024-04-17 RX ORDER — ATORVASTATIN CALCIUM 40 MG/1
40 TABLET, FILM COATED ORAL DAILY
Qty: 90 TABLET | Refills: 0 | Status: SHIPPED | OUTPATIENT
Start: 2024-04-17

## 2024-04-17 NOTE — TELEPHONE ENCOUNTER
Called patient to relay test results below. Patient is aware and is scheduled for 7/9/24 at 8:30 AM. Patient is going to have hernia surgery on 4/25/24 in [place]. Patient does not need a pre op from our office.

## 2024-04-17 NOTE — TELEPHONE ENCOUNTER
----- Message from Kate Carlson MD sent at 4/17/2024 10:24 AM EDT -----  Hi ladies,    Can you please call patient and inform him of the below:    After review of your lab work, overall things look stable. Blood counts, kidney, liver and thyroid function are normal.  Cholesterol levels were slightly and PCP does recommend increasing his dose of atorvastatin from 10mg to 40 mg at this time ( I already sent the new script to the pharmacy0.  Hemoglobin A1c at 5.8 indicating prediabetes which has been stable.  Vitamin D and iron levels were stable.  His thyroid levels will require further monitoring and I will repeat them in 6 to 8 weeks. Please have him schedule an appointment in 2-3 months to follow up on above. Please let me know if you have any questions or concerns.    Best,    _________________________________________________________________

## 2024-04-19 ENCOUNTER — TELEPHONE (OUTPATIENT)
Dept: FAMILY MEDICINE CLINIC | Facility: CLINIC | Age: 79
End: 2024-04-19

## 2024-04-19 RX ORDER — CETIRIZINE HYDROCHLORIDE 10 MG/1
10 TABLET ORAL DAILY
COMMUNITY

## 2024-04-19 NOTE — TELEPHONE ENCOUNTER
Patient called in stating he is still having a runny nose that is green/yellow. He has been getting a bad headache towards the back as well. He finished his z pack on Tuesday but that's the only symptoms left. Patient is going to take Zyrtec to see if it's just allergies and call us Monday or Tuesday.

## 2024-04-23 ENCOUNTER — TELEPHONE (OUTPATIENT)
Dept: FAMILY MEDICINE CLINIC | Facility: CLINIC | Age: 79
End: 2024-04-23

## 2024-04-23 NOTE — TELEPHONE ENCOUNTER
I called patient to   check in and see how his nasal drainage has been.  Patient didn't answer his phone .  I left a voicemail to call the office .

## 2024-04-23 NOTE — TELEPHONE ENCOUNTER
Patient called, he says that he has already finished the antibiotics that the doctor sent him, he has had an improvement, his neck and head don't hurt any more but he still has a lot of cough with green discharge and this worries him because he has surgery on Thursday. He asked if the doctor could call him.

## 2024-05-17 ENCOUNTER — TELEPHONE (OUTPATIENT)
Age: 79
End: 2024-05-17

## 2024-05-17 ENCOUNTER — TELEPHONE (OUTPATIENT)
Dept: FAMILY MEDICINE CLINIC | Facility: CLINIC | Age: 79
End: 2024-05-17

## 2024-05-17 NOTE — TELEPHONE ENCOUNTER
Nandini from [facility], transferred patient into the office to discuss a pre op clearance. I was uncertain what surgery he was talking about. He is scheduled to have a lumb on right side of belly button removed on 5/28/24. He said he needed a form signed off stating that he does not take Xarelto. I informed him that medication is not on his current or previous medication list. I am not sure if his cardiologist prescribed it because he is seen with [facility] and recent office visit note from 4/12/24 does not have medications listed. I informed him that we don't currently have any openings for a pre op clearance. Patient stated he is not pushing back the surgery again but told him I would put him on the cancellation list. Patient should reach out to cardiologist to get forms signed off if he prescribed the Xarelto.     Patient gave me the contact information for Dr. Wilkins to see what was needed as well ([phone]). I spoke with Sarah about patient and she said patient informed them that he takes Xarelto but is prescribed by PCP which is our office. She will call our office back to see if pre op needs to be done by PCP or cardiology.

## 2024-05-20 ENCOUNTER — TELEPHONE (OUTPATIENT)
Age: 79
End: 2024-05-20

## 2024-05-20 NOTE — TELEPHONE ENCOUNTER
Called back and no answer, left a voicemail to call the office back.    Please see phone encounter from 5/17/24. Office is waiting to hear back from Dr. Wilkins office.

## 2024-05-20 NOTE — TELEPHONE ENCOUNTER
Called [facility] to touch base if clearance was needed by PCP and cardiac clearance is needed and was received while on the phone.

## 2024-05-20 NOTE — TELEPHONE ENCOUNTER
Patient is getting surgery at [facility] in [facility] soon. They are requesting  clearance. Patient also said that they need to let [facility] know that he is not on blood thinners. He was last seen in April so he wants to know if its still needed for him to come in for a pre-op visit.     Please contact patient.     [facility] Fax: [phone]

## 2024-05-23 ENCOUNTER — TELEPHONE (OUTPATIENT)
Age: 79
End: 2024-05-23

## 2024-05-23 NOTE — TELEPHONE ENCOUNTER
Patient spouse calling in stating Dr. Wilkins in [place] who patient will be getting surgery with on Tuesday 6/28 is requesting patients most recent blood work be faxed to them. Fax [phone]  prior to surgery on Tuesday.

## 2024-05-24 ENCOUNTER — TELEPHONE (OUTPATIENT)
Age: 79
End: 2024-05-24

## 2024-05-24 NOTE — TELEPHONE ENCOUNTER
Patients spouse called to follow up on fax for patient labs for upcoming surgery 5/28. Spouse stated office did not receive labs. Please re fax.    Fax number : [phone]    Spouse would like a phone call when labs are faxed.    Please advise.   Thank you

## 2024-05-24 NOTE — TELEPHONE ENCOUNTER
Patient's wife called regarding bloodwork that needed to be sent to his surgeons office. Spoke with Courtney at the [place] office and she was sending them over at 11:00.

## 2024-05-24 NOTE — TELEPHONE ENCOUNTER
Called Ivy to inform her that labs were sent by fax three times. I called Dr. Wilkins's office to speak with them to confirm labs were received. Office confirmed.

## 2024-05-24 NOTE — TELEPHONE ENCOUNTER
Patient's wife called requesting lab work be re-written as stat since his surgery is on Tuesday and they are going to get blood work today.

## 2024-07-09 ENCOUNTER — OFFICE VISIT (OUTPATIENT)
Dept: FAMILY MEDICINE CLINIC | Facility: CLINIC | Age: 79
End: 2024-07-09
Payer: COMMERCIAL

## 2024-07-09 ENCOUNTER — LAB (OUTPATIENT)
Dept: LAB | Facility: CLINIC | Age: 79
End: 2024-07-09
Payer: COMMERCIAL

## 2024-07-09 VITALS
TEMPERATURE: 96.6 F | WEIGHT: 142.1 LBS | SYSTOLIC BLOOD PRESSURE: 122 MMHG | OXYGEN SATURATION: 98 % | BODY MASS INDEX: 21.05 KG/M2 | HEIGHT: 69 IN | HEART RATE: 66 BPM | DIASTOLIC BLOOD PRESSURE: 62 MMHG

## 2024-07-09 DIAGNOSIS — R94.6 ABNORMAL THYROID FUNCTION TEST: ICD-10-CM

## 2024-07-09 DIAGNOSIS — Z98.890 S/P HERNIA REPAIR: ICD-10-CM

## 2024-07-09 DIAGNOSIS — Z87.19 S/P HERNIA REPAIR: ICD-10-CM

## 2024-07-09 DIAGNOSIS — C61 PROSTATE CANCER (HCC): ICD-10-CM

## 2024-07-09 DIAGNOSIS — E78.5 HYPERLIPIDEMIA, UNSPECIFIED HYPERLIPIDEMIA TYPE: Primary | ICD-10-CM

## 2024-07-09 DIAGNOSIS — R73.03 PREDIABETES: ICD-10-CM

## 2024-07-09 DIAGNOSIS — E03.9 HYPOTHYROIDISM, UNSPECIFIED TYPE: ICD-10-CM

## 2024-07-09 DIAGNOSIS — C49.A0 MALIGNANT GASTROINTESTINAL STROMAL TUMOR, UNSPECIFIED SITE (HCC): ICD-10-CM

## 2024-07-09 LAB
T4 FREE SERPL-MCNC: 0.96 NG/DL (ref 0.61–1.12)
TSH SERPL DL<=0.05 MIU/L-ACNC: 3.67 UIU/ML (ref 0.45–4.5)

## 2024-07-09 PROCEDURE — 36415 COLL VENOUS BLD VENIPUNCTURE: CPT

## 2024-07-09 PROCEDURE — G2211 COMPLEX E/M VISIT ADD ON: HCPCS | Performed by: FAMILY MEDICINE

## 2024-07-09 PROCEDURE — 86376 MICROSOMAL ANTIBODY EACH: CPT

## 2024-07-09 PROCEDURE — 99213 OFFICE O/P EST LOW 20 MIN: CPT | Performed by: FAMILY MEDICINE

## 2024-07-09 PROCEDURE — 86800 THYROGLOBULIN ANTIBODY: CPT

## 2024-07-09 PROCEDURE — 84443 ASSAY THYROID STIM HORMONE: CPT

## 2024-07-09 PROCEDURE — 84439 ASSAY OF FREE THYROXINE: CPT

## 2024-07-09 NOTE — PROGRESS NOTES
Assessment & Plan     Diagnoses and all orders for this visit:    Hyperlipidemia, unspecified hyperlipidemia type  Comments:  Total cholesterol-208,    Taking Lipitor 10 mg 3 times a week  Reports significant leg cramps with atorvastatin  Patient reports not taking co-Q10 that was prescribed last visit  ASCVD risk 25.1%-discussed risk versus benefit in detail  Advised patient to take Lipitor 10 mg daily with co-Q10 for next 1 month  If patient continues to experience leg cramps, consider low-dose rosuvastatin  Follow-up in 4 weeks    Prostate cancer (HCC)  Comments:  S/p prostatectomy in 2020 for prostate ca  Follows urology at [facility]  Latest PSA 4/2024 less than 0.02    S/P hernia repair  Comments:  -Underwent robotic converted to open extensive TUAN and incisional hernia repair with mesh on 5/28/2024.  -Denies acute symptoms  -Following surgery at [facility]  -Patient has a follow-up with surgery in 2 weeks  -Drain in place, nonbloody, less than 10 mL fluid, no signs of infection  -Continue to follow with surgery    Hypothyroidism, unspecified type  Comments:  -Labs reviewed-TSH elevated, T4 normal  -Subclinical hypothyroidism  -Repeat labs ordered, patient to check this week  -Continue current treatment  -Will review labs once resulted    Malignant gastrointestinal stromal tumor, unspecified site (HCC)  Comments:  -History of Stage IA Gastric GIST s/p resection in 2008  -Follows for [facility]    Prediabetes  Comments:  HbA1c in the prediabetic range, stable  Counseled on low-carb diet        Subjective     Patient Id: Bryan Cooper is a 78 y.o. male    Patient is a 78-year-old male with a past medical history of HLD, GERD, prostatectomy, hernia repair who presents to the office today for lab review.  Patient reports that he was recently discharged from [facility] for hernia repair.  Patient states that he was originally admitted end of May for his  surgery and was discharged on 6/30/2024.  He reports that postdischarge he could not tolerate food and experience continued vomiting which prompted him to get readmitted in the hospital.  Patient reports that he was medically managed in the hospital for about a week and was discharged few weeks ago.  Patient states that since discharged, he feels much better.  He reports that he is able to tolerate food well.  Denies any acute concerns this visit.  Patient states that he is scheduled to follow-up with surgery in 2 weeks.    Patient states that he has been taking his atorvastatin 10 mg 3 times a week.  He reports significant muscle cramps with atorvastatin use.  Patient reports that he has not been taking co-Q10 that was prescribed last visit.  Fevers, chills, chest pain, SOB, abdominal pain, nausea/vomiting.        Review of Systems   Constitutional:  Negative for chills and fever.   HENT:  Negative for ear pain and sore throat.    Eyes:  Negative for pain and visual disturbance.   Respiratory:  Negative for cough and shortness of breath.    Cardiovascular:  Negative for chest pain and palpitations.   Gastrointestinal:  Negative for abdominal pain and vomiting.   Genitourinary:  Negative for dysuria and hematuria.   Musculoskeletal:  Negative for arthralgias and back pain.   Skin:  Negative for color change and rash.   Neurological:  Negative for seizures and syncope.   All other systems reviewed and are negative.      Past Medical History:   Diagnosis Date    Allergic     Disease of thyroid gland        Past Surgical History:   Procedure Laterality Date    PROSTATE SURGERY  2022    SMALL INTESTINE SURGERY N/A     STOMACH SURGERY  1998       Family History   Problem Relation Age of Onset    Heart attack Mother     Heart attack Father     Hypertension Father     Heart attack Sister     Heart attack Brother        Social History     Socioeconomic History    Marital status: Single     Spouse name: None    Number of  children: None    Years of education: None    Highest education level: None   Occupational History    None   Tobacco Use    Smoking status: Never     Passive exposure: Never    Smokeless tobacco: Never   Vaping Use    Vaping status: Never Used   Substance and Sexual Activity    Alcohol use: Not Currently     Alcohol/week: 1.0 standard drink of alcohol     Types: 1 Glasses of wine per week     Comment: occassional    Drug use: Never    Sexual activity: Not Currently     Birth control/protection: Abstinence   Other Topics Concern    None   Social History Narrative    None     Social Determinants of Health     Financial Resource Strain: Low Risk  (1/19/2023)    Overall Financial Resource Strain (CARDIA)     Difficulty of Paying Living Expenses: Not hard at all   Food Insecurity: No Food Insecurity (6/17/2024)    Received from [facility], [facility], and [facility]    Hunger Vital Sign     Worried About Running Out of Food in the Last Year: Never true     Ran Out of Food in the Last Year: Never true   Transportation Needs: Unknown (6/17/2024)    Received from [facility], [facility], and [facility]    PRAPARE - Transportation     Lack of Transportation (Medical): No     Lack of Transportation (Non-Medical): Not on file   Physical Activity: Not on file   Stress: Not on file   Social Connections: Not on file   Intimate Partner Violence: Not on file   Housing Stability: Unknown (4/12/2024)    Housing Stability Vital Sign     Unable to Pay for Housing in the Last Year: No     Number of Times Moved in the Last Year: Not on file     Homeless in the Last Year: No       Allergies   Allergen Reactions    Iodine - Food Allergy Hives, Anaphylaxis and Rash     CT dye  CT dye      Nuts - Food Allergy Itching     Tongue irritation and pruritis.  Itching of lips and tongue      Sulfa Antibiotics Hives and Rash    Codeine Abdominal Pain and Other (See  "Comments)     Stomach pain      Shellfish Allergy - Food Allergy Rash    Iodinated Contrast Media Rash         Current Outpatient Medications:     ARTIFICIAL TEAR SOLUTION OP, Apply 2 drops to eye daily, Disp: , Rfl:     Biotin 1000 MCG tablet, Take 2,500 mcg by mouth 3 (three) times a week, Disp: , Rfl:     co-enzyme Q-10 100 mg capsule, Take 100 mg by mouth in the morning, Disp: , Rfl:     levothyroxine 50 mcg tablet, Take 1 tablet (50 mcg total) by mouth daily, Disp: 90 tablet, Rfl: 1    omeprazole (PriLOSEC) 10 mg delayed release capsule, Take 1 capsule (10 mg total) by mouth daily, Disp: 90 capsule, Rfl: 1    polyethylene glycol (MIRALAX) 17 g packet, Take 17 g by mouth daily, Disp: , Rfl:     therapeutic multivitamin-minerals (THERAGRAN-M) tablet, Take 1 tablet by mouth daily, Disp: , Rfl:     atorvastatin (LIPITOR) 40 mg tablet, Take 1 tablet (40 mg total) by mouth daily (Patient not taking: Reported on 7/9/2024), Disp: 90 tablet, Rfl: 0    cetirizine (ZyrTEC) 10 mg tablet, Take 10 mg by mouth daily (Patient not taking: Reported on 7/9/2024), Disp: , Rfl:     Elastic Bandages & Supports (Abdominal Binder/Elastic Small) MISC, Use in the morning, Disp: 1 each, Rfl: 0    Icosapent Ethyl 1 g CAPS, Take 1 capsule (1 g total) by mouth 2 (two) times a day (Patient not taking: Reported on 7/9/2024), Disp: 180 capsule, Rfl: 1    Linzess 72 MCG CAPS, Take 72 mcg by mouth daily (Patient not taking: Reported on 7/9/2024), Disp: , Rfl:     Xdemvy 0.25 % SOLN, , Disp: , Rfl:     Objective     Vitals:    07/09/24 0830   BP: 122/62   BP Location: Left arm   Pulse: 66   Temp: (!) 96.6 °F (35.9 °C)   TempSrc: Tympanic   SpO2: 98%   Weight: 64.5 kg (142 lb 1.6 oz)   Height: 5' 9\" (1.753 m)       Physical Exam  Vitals and nursing note reviewed.   Constitutional:       General: He is not in acute distress.     Appearance: He is well-developed.   HENT:      Head: Normocephalic and atraumatic.   Eyes:      Conjunctiva/sclera: " Conjunctivae normal.   Cardiovascular:      Rate and Rhythm: Normal rate and regular rhythm.      Heart sounds: No murmur heard.  Pulmonary:      Effort: Pulmonary effort is normal. No respiratory distress.      Breath sounds: Normal breath sounds.   Abdominal:      Palpations: Abdomen is soft.      Tenderness: There is no abdominal tenderness.      Comments: Drain in place-nonbloody, about 10 mL serosanguineous fluid.  No visible discharge, surgical site appears clean.   Musculoskeletal:         General: No swelling.      Cervical back: Neck supple.   Skin:     General: Skin is warm and dry.      Capillary Refill: Capillary refill takes less than 2 seconds.   Neurological:      Mental Status: He is alert and oriented to person, place, and time.   Psychiatric:         Mood and Affect: Mood normal.         Rosey Coleman MD  Family Medicine

## 2024-07-10 ENCOUNTER — TELEPHONE (OUTPATIENT)
Dept: FAMILY MEDICINE CLINIC | Facility: CLINIC | Age: 79
End: 2024-07-10

## 2024-07-10 ENCOUNTER — OFFICE VISIT (OUTPATIENT)
Dept: FAMILY MEDICINE CLINIC | Facility: CLINIC | Age: 79
End: 2024-07-10
Payer: COMMERCIAL

## 2024-07-10 VITALS
TEMPERATURE: 97.8 F | HEART RATE: 65 BPM | BODY MASS INDEX: 21.53 KG/M2 | DIASTOLIC BLOOD PRESSURE: 64 MMHG | HEIGHT: 69 IN | OXYGEN SATURATION: 98 % | WEIGHT: 145.4 LBS | SYSTOLIC BLOOD PRESSURE: 112 MMHG

## 2024-07-10 DIAGNOSIS — Z01.818 PRE-OP EXAMINATION: Primary | ICD-10-CM

## 2024-07-10 LAB
THYROGLOB AB SERPL-ACNC: <1 IU/ML (ref 0–0.9)
THYROPEROXIDASE AB SERPL-ACNC: 15 IU/ML (ref 0–34)

## 2024-07-10 PROCEDURE — 99213 OFFICE O/P EST LOW 20 MIN: CPT | Performed by: FAMILY MEDICINE

## 2024-07-10 NOTE — PROGRESS NOTES
"PRE-OPERATIVE EXAMINATION  Bryan Cooper  1945    Bryan Cooper is a 78 y.o. male with a hx of Prostate cancer s/p prostatectomy in 2020, hyperlipidemia, hypothyroidism who is planning to undergo Left eye pterygium removal under general by Dr. Dakota Esposito on 7/23/24 at [facility]. The procedure is indicated for the following condition: Left eye pterygium Patient has not had complications with anesthesia in the past.    ROS:   Chest pain: no   Shortness of breath: no  Shortness of breath with exertion: no  Orthopnea: no  Dizziness: no  Unexplained weight change: no    PMH:  CAD: no  HTN: no  CKD: no  DM: no on insulin: no  History of CVA: no    He  reports that he has never smoked. He has never been exposed to tobacco smoke. He has never used smokeless tobacco. He reports that he does not currently use alcohol after a past usage of about 1.0 standard drink of alcohol per week. He reports that he does not use drugs.    /64   Pulse 65   Temp 97.8 °F (36.6 °C) (Tympanic)   Ht 5' 9\" (1.753 m)   Wt 66 kg (145 lb 6.4 oz)   SpO2 98%   BMI 21.47 kg/m²   Physical Exam  Vitals reviewed.   Constitutional:       General: He is not in acute distress.     Appearance: Normal appearance. He is well-developed. He is not ill-appearing or toxic-appearing.   HENT:      Head: Normocephalic.   Eyes:      General: No scleral icterus.     Extraocular Movements: Extraocular movements intact.   Cardiovascular:      Rate and Rhythm: Normal rate and regular rhythm.      Pulses: Normal pulses.      Heart sounds: Normal heart sounds. No murmur heard.  Pulmonary:      Effort: Pulmonary effort is normal. No respiratory distress.      Breath sounds: Normal breath sounds. No wheezing.   Abdominal:      General: Bowel sounds are normal.      Palpations: Abdomen is soft.      Tenderness: There is no abdominal tenderness.      Comments: Drain in place-nonbloody, about 5 mL serous fluid.  No visible discharge, surgical site " appears clean.  C/D/I hernia wound . Mesh underneath appreciated.   Musculoskeletal:         General: No swelling.      Cervical back: Neck supple.      Right lower leg: No edema.      Left lower leg: No edema.      Comments: 5 out of 5 muscle strength in bilateral upper and lower extremities  Good of motion   Skin:     General: Skin is warm.      Capillary Refill: Capillary refill takes less than 2 seconds.   Neurological:      Mental Status: He is alert and oriented to person, place, and time.      Sensory: No sensory deficit.      Motor: Motor function is intact. No weakness.      Coordination: Coordination is intact. Finger-Nose-Finger Test normal.      Gait: Gait is intact. Gait normal.   Psychiatric:         Mood and Affect: Mood normal.         Revised Cardiac Risk Index (RCRI) for Pre-Operative Risk   (estimates risk of cardiac complications after noncardiac surgery)    High-risk surgery: No 0 / Yes +1  Intraperitoneal, intrathoracic, suprainguinal vascular  History of ischemic heart disease: No 0 / Yes +1  Hx of MI, (+) exercise test, current chest pain considered due to myocardial ischemia, use of nitrate therapy or ECG with pathological Q waves)  History of CHF: No 0 / Yes +1  Pulmonary edema, B/L rales or S3 gallop; MARRERO, orthopnea, PND, CXR showing pulmonary vascular redistribution)  History of cerebrovascular disease: No 0 / Yes +1  Prior TIA or stroke  Pre-operative treatment with insulin: No 0 / Yes +1  Pre-operative creatinine >2 mg/dL: No 0 / Yes +1    RCRI Scorin points: Class I Risk, 3.9% 30-day risk of death, MI, or cardiac arrest  1 point: Class II Risk, 6.0% 30-day risk of death, MI, or cardiac arrest  2 points: Class III Risk, 10.1% 30-day risk of death, MI, or cardiac arrest  3 points: Class IV Risk, 15% 30-day risk of death, MI, or cardiac arrest  4 points: Class IV Risk, 15% 30-day risk of death, MI, or cardiac arrest  5 points: Class IV Risk, 15% 30-day risk of death, MI, or cardiac  arrest  6 points: Class IV Risk, 15% 30-day risk of death, MI, or cardiac arrest    Lab Results   Component Value Date    CREATININE 0.85 05/24/2024       Bryan was seen today for pre-op exam.    Diagnoses and all orders for this visit:    Pre-op examination        Recommendations:  Bryan Cooper is undergoing an elective Minimal risk surgery, eye surgery. He is RCRI class I risk (0 points) with 3.9% 30-day risk of death, MI, or cardiac arrest. He may proceed with surgery as planned without further workup. Pre-operative form completed and faxed today to office as requested.    Discussed with Dr. Kate Carlson, who is in agreement with the plan as outlined above.

## 2024-07-18 ENCOUNTER — TELEPHONE (OUTPATIENT)
Age: 79
End: 2024-07-18

## 2024-07-18 NOTE — TELEPHONE ENCOUNTER
Called Bryan back at the number provided. Went to voicemail. Called patient to let him know it is normal to have some bruising at times from bloodwork. Continue to monitor for swelling and warm to touch or pain that is not consistent with bruising.     I will forward the message to the provider about filling out a handicap placard and let him know if there is any question or concern regarding the placard. Please let him know forms can take 7 - 10 days to complete.    Thank you      For office Staff:  If it is appropriate please complete log in form and provider Dr. Victoria with [employer] Form to Complete. Contact patient when it is completed.     Thank you

## 2024-07-18 NOTE — TELEPHONE ENCOUNTER
"Patient called stating he had blood work done on 7/9 and the person drawing his blood had a little trouble finding the vein, as a result his left arm has a \"black and blue\" on venipuncture site. He denies any pain or swelling. He wants to know if he should be concerned?     Patient also states he would like PCP to fill out a Placard application for Handicap parking.  He can be reached at [phone]  "

## 2024-07-19 NOTE — TELEPHONE ENCOUNTER
Patient is aware of message below and thankful for us calling him back. Patient stated the swelling went away but left with a half dollar size bruise but no pain associated with it.

## 2024-08-05 ENCOUNTER — TELEPHONE (OUTPATIENT)
Dept: FAMILY MEDICINE CLINIC | Facility: CLINIC | Age: 79
End: 2024-08-05

## 2024-08-05 NOTE — TELEPHONE ENCOUNTER
Called patient to let him know that the form to disable the parking placard has been completed. He didn't answer, I left a voicemail with the information.

## 2024-09-05 ENCOUNTER — TELEPHONE (OUTPATIENT)
Dept: FAMILY MEDICINE CLINIC | Facility: CLINIC | Age: 79
End: 2024-09-05

## 2024-09-30 NOTE — RESULT ENCOUNTER NOTE
Detail Level: Detailed Juan Carlos patel,    Can you please call patient and inform him of the below:    After review of your lab work, overall things look stable. Blood counts, kidney, liver and thyroid function are normal.  Cholesterol levels were slightly and PCP does recommend increasing his dose of atorvastatin from 10mg to 40 mg at this time ( I already sent the new script to the pharmacy0.  Hemoglobin A1c at 5.8 indicating prediabetes which has been stable.  Vitamin D and iron levels were stable.  His thyroid levels will require further monitoring and I will repeat them in 6 to 8 weeks. Please have him schedule an appointment in 2-3 months to follow up on above. Please let me know if you have any questions or concerns.    Best,    _________________________________________________________________       LP shows > 2000 WBC with neurophil predominance, protein is high normal glucose   MRI shows encephalitis and ventriculitis findings   HSV and meningitis panel was all negative   CSF Cryptococcus negative, HIV,  West nile virus , Lyme disease, Dane mountain spotted fever antibody negative, CSF VDRL negative.     EEG shows encephalopathy and no epileptiform activity   Discussed with neurology and ID  MRI and LP was repeated on 9/19 shows WBC improved, but other wise similar findings.   Antibiotics narrowed down to p.o. doxycycline and IV Rocephin.  Patient is seems responded to very well to this regimen, mental status much improved, fever resolved, leukocytosis resolved.   Patient has a recently trip to [place]   I highly suspect patient may have HGA or HME CNS infection.   Check ehrlichiosis antibody panel, check blood peripheral smear to look for  morale inclusions.   ID note appreciated, continue IV Rocephin and p.o. doxycycline, anticipate last dose of IV Rocephin on 9/30.     PT recommended rehab placement.  Likely discharge after last dose of IV Rocephin on 9/30.

## 2024-10-05 DIAGNOSIS — E03.9 ACQUIRED HYPOTHYROIDISM: ICD-10-CM

## 2024-10-07 RX ORDER — LEVOTHYROXINE SODIUM 50 UG/1
50 TABLET ORAL DAILY
Qty: 90 TABLET | Refills: 1 | Status: SHIPPED | OUTPATIENT
Start: 2024-10-07

## 2024-10-18 ENCOUNTER — OFFICE VISIT (OUTPATIENT)
Dept: FAMILY MEDICINE CLINIC | Facility: CLINIC | Age: 79
End: 2024-10-18
Payer: COMMERCIAL

## 2024-10-18 VITALS
BODY MASS INDEX: 21.3 KG/M2 | WEIGHT: 143.8 LBS | TEMPERATURE: 97.1 F | SYSTOLIC BLOOD PRESSURE: 130 MMHG | HEART RATE: 55 BPM | OXYGEN SATURATION: 100 % | DIASTOLIC BLOOD PRESSURE: 68 MMHG | HEIGHT: 69 IN

## 2024-10-18 DIAGNOSIS — E78.2 MIXED HYPERLIPIDEMIA: ICD-10-CM

## 2024-10-18 DIAGNOSIS — I82.441 ACUTE DEEP VEIN THROMBOSIS (DVT) OF TIBIAL VEIN OF RIGHT LOWER EXTREMITY (HCC): ICD-10-CM

## 2024-10-18 DIAGNOSIS — H11.001 PTERYGIUM OF RIGHT EYE: Primary | ICD-10-CM

## 2024-10-18 DIAGNOSIS — D64.9 ANEMIA, UNSPECIFIED TYPE: ICD-10-CM

## 2024-10-18 PROCEDURE — 99214 OFFICE O/P EST MOD 30 MIN: CPT | Performed by: FAMILY MEDICINE

## 2024-10-18 RX ORDER — NEOMYCIN SULFATE, POLYMYXIN B SULFATE, AND DEXAMETHASONE 3.5; 10000; 1 MG/G; [USP'U]/G; MG/G
OINTMENT OPHTHALMIC
COMMUNITY
Start: 2024-09-16

## 2024-10-18 RX ORDER — ICOSAPENT ETHYL 1 G/1
1 CAPSULE ORAL 2 TIMES DAILY
Qty: 180 CAPSULE | Refills: 1 | Status: SHIPPED | OUTPATIENT
Start: 2024-10-18

## 2024-10-18 NOTE — PROGRESS NOTES
Pre-operative Clearance  Name: Bryan Cooper      : 1945      MRN: [id number]  Encounter Provider: Kate Carlson MD  Encounter Date: 10/18/2024   Encounter department: [facility]    Assessment & Plan  Pterygium of right eye  - Patient presents for Pre-op for excision of pterygium on 24. Patient may proceed with surgery as planned.       Pre-operative Clearance:     Revised Cardiac Risk Index:  RCI RISK CLASS I (0 risk factors, risk of major cardiac complications approximately 0.5%)    Clearance:  Patient is medically optimized (CLEARED) for proposed surgery without any additional cardiac testing.      Medication Instructions:   - Avoid herbs or non-directed vitamins one week prior to surgery    - May take tylenol for pain up until the night before surgery    - Hyperlipidemia meds: Continue to take this medication on your normal schedule.  - Thyroid meds: Continue to take this medication on your normal schedule.       History of Present Illness     Pre-op Exam    Pre-operative Risk Factors:    History of cerebrovascular disease: No    History of ischemic heart disease: No  Pre-operative treatment with insulin: No  Pre-operative creatinine >2 mg/dL: No    History of congestive heart failure: No    Review of Systems   Respiratory:  Negative for shortness of breath.    Cardiovascular:  Negative for chest pain and palpitations.   Neurological:  Negative for headaches.     Past Medical History   Past Medical History:   Diagnosis Date    Allergic     Disease of thyroid gland      Past Surgical History:   Procedure Laterality Date    ABDOMINAL HERNIA REPAIR  2024    EYE SURGERY      PROSTATE SURGERY      SMALL INTESTINE SURGERY N/A     STOMACH SURGERY       Family History   Problem Relation Age of Onset    Heart attack Mother     Heart attack Father     Hypertension Father     Heart attack Sister     Heart attack Brother      Social History     Tobacco Use     "Smoking status: Never     Passive exposure: Never    Smokeless tobacco: Never   Vaping Use    Vaping status: Never Used   Substance and Sexual Activity    Alcohol use: Not Currently     Alcohol/week: 1.0 standard drink of alcohol     Types: 1 Glasses of wine per week     Comment: occassional    Drug use: Never    Sexual activity: Not Currently     Birth control/protection: Abstinence     Current Outpatient Medications on File Prior to Visit   Medication Sig    ARTIFICIAL TEAR SOLUTION OP Apply 2 drops to eye daily    atorvastatin (LIPITOR) 40 mg tablet Take 1 tablet (40 mg total) by mouth daily (Patient taking differently: Take 20 mg by mouth daily)    Biotin 1000 MCG tablet Take 2,500 mcg by mouth 3 (three) times a week    co-enzyme Q-10 100 mg capsule Take 100 mg by mouth in the morning    Icosapent Ethyl 1 g CAPS Take 1 capsule (1 g total) by mouth 2 (two) times a day    levothyroxine 50 mcg tablet TAKE 1 TABLET BY MOUTH EVERY DAY    neomycin-polymyxin-dexamethasone (MAXITROL) 0.35%-10,000 units/g-0.1% START AFTER SURGERY. APPLY SMALL AMOUNT TO RIGHT EYE 3X/DAY.    omeprazole (PriLOSEC) 10 mg delayed release capsule Take 1 capsule (10 mg total) by mouth daily    therapeutic multivitamin-minerals (THERAGRAN-M) tablet Take 1 tablet by mouth daily    polyethylene glycol (MIRALAX) 17 g packet Take 17 g by mouth daily (Patient not taking: Reported on 10/18/2024)     Allergies   Allergen Reactions    Iodine - Food Allergy Hives, Anaphylaxis and Rash     CT dye  CT dye      Nuts - Food Allergy Itching     Tongue irritation and pruritis.  Itching of lips and tongue      Sulfa Antibiotics Hives and Rash    Codeine Abdominal Pain and Other (See Comments)     Stomach pain      Shellfish Allergy - Food Allergy Rash    Iodinated Contrast Media Rash     Objective     /68 (BP Location: Left arm)   Pulse 55   Temp (!) 97.1 °F (36.2 °C) (Tympanic)   Ht 5' 9\" (1.753 m)   Wt 65.2 kg (143 lb 12.8 oz)   SpO2 100%   BMI " 21.24 kg/m²     Physical Exam  Vitals reviewed.   Constitutional:       Appearance: Normal appearance.   HENT:      Head: Normocephalic and atraumatic.      Right Ear: External ear normal.      Left Ear: External ear normal.      Nose: Nose normal.      Mouth/Throat:      Mouth: Mucous membranes are moist.      Pharynx: Oropharynx is clear.   Eyes:      Conjunctiva/sclera: Conjunctivae normal.   Cardiovascular:      Rate and Rhythm: Normal rate and regular rhythm.      Pulses: Normal pulses.   Pulmonary:      Effort: Pulmonary effort is normal.      Breath sounds: Normal breath sounds.   Abdominal:      General: Bowel sounds are normal.      Palpations: Abdomen is soft.      Tenderness: There is no abdominal tenderness.   Musculoskeletal:         General: Normal range of motion.      Cervical back: Neck supple.   Skin:     General: Skin is warm and dry.   Neurological:      Mental Status: He is alert. Mental status is at baseline.   Psychiatric:         Mood and Affect: Mood normal.           Kate Carlson MD

## 2024-10-18 NOTE — PATIENT INSTRUCTIONS
Pre-operative Medication Instructions    Avoid herbs or non-directed vitamins one week prior to surgery  Avoid aspirin containing medications or non-steroidal anti-inflammatory drugs one week preceding surgery  May take tylenol for pain up until the night before surgery    Cholesterol lowering meds     Medication Name     atorvastatin (LIPITOR) 40 mg tablet     Icosapent Ethyl 1 g CAPS      Continue to take this medication on your normal schedule.  If this is an oral medication and you take it in the morning, then you may take this medicine with a sip of water.    Thyroid Medications     Medication Name     levothyroxine 50 mcg tablet      Continue to take this medication on your normal schedule.  If this is an oral medication and you take it in the morning, then you may take this medicine with a sip of water.

## 2024-10-21 ENCOUNTER — HOSPITAL ENCOUNTER (EMERGENCY)
Facility: HOSPITAL | Age: 79
Discharge: HOME | End: 2024-10-21
Attending: EMERGENCY MEDICINE | Admitting: EMERGENCY MEDICINE
Payer: COMMERCIAL

## 2024-10-21 ENCOUNTER — TELEPHONE (OUTPATIENT)
Dept: FAMILY MEDICINE CLINIC | Facility: CLINIC | Age: 79
End: 2024-10-21

## 2024-10-21 ENCOUNTER — NURSE TRIAGE (OUTPATIENT)
Age: 79
End: 2024-10-21

## 2024-10-21 ENCOUNTER — APPOINTMENT (EMERGENCY)
Dept: CT IMAGING | Facility: HOSPITAL | Age: 79
End: 2024-10-21
Payer: COMMERCIAL

## 2024-10-21 ENCOUNTER — TELEPHONE (OUTPATIENT)
Age: 79
End: 2024-10-21

## 2024-10-21 VITALS
DIASTOLIC BLOOD PRESSURE: 64 MMHG | WEIGHT: 143 LBS | OXYGEN SATURATION: 96 % | BODY MASS INDEX: 21.18 KG/M2 | RESPIRATION RATE: 16 BRPM | SYSTOLIC BLOOD PRESSURE: 130 MMHG | TEMPERATURE: 97.4 F | HEIGHT: 69 IN | HEART RATE: 52 BPM

## 2024-10-21 DIAGNOSIS — S39.012A STRAIN OF LUMBAR REGION, INITIAL ENCOUNTER: Primary | ICD-10-CM

## 2024-10-21 DIAGNOSIS — M54.9 BACK PAIN: ICD-10-CM

## 2024-10-21 DIAGNOSIS — K21.9 GASTROESOPHAGEAL REFLUX DISEASE WITHOUT ESOPHAGITIS: ICD-10-CM
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
## 2024-10-21 DIAGNOSIS — R10.31 RIGHT LOWER QUADRANT ABDOMINAL PAIN: ICD-10-CM

## 2024-10-21 LAB
ALBUMIN SERPL BCG-MCNC: 4 G/DL (ref 3.5–5)
ALP SERPL-CCNC: 72 U/L (ref 34–104)
ALT SERPL W P-5'-P-CCNC: 20 U/L (ref 7–52)
ANION GAP SERPL CALCULATED.3IONS-SCNC: 8 MMOL/L (ref 4–13)
AST SERPL W P-5'-P-CCNC: 20 U/L (ref 13–39)
BASOPHILS # BLD AUTO: 0.03 THOUSANDS/ΜL (ref 0–0.1)
BASOPHILS NFR BLD AUTO: 1 % (ref 0–1)
BILIRUB SERPL-MCNC: 0.4 MG/DL (ref 0.2–1)
BUN SERPL-MCNC: 21 MG/DL (ref 5–25)
CALCIUM SERPL-MCNC: 9.6 MG/DL (ref 8.4–10.2)
CHLORIDE SERPL-SCNC: 105 MMOL/L (ref 96–108)
CO2 SERPL-SCNC: 26 MMOL/L (ref 21–32)
CREAT SERPL-MCNC: 0.83 MG/DL (ref 0.6–1.3)
EOSINOPHIL # BLD AUTO: 0.19 THOUSAND/ΜL (ref 0–0.61)
EOSINOPHIL NFR BLD AUTO: 4 % (ref 0–6)
ERYTHROCYTE [DISTWIDTH] IN BLOOD BY AUTOMATED COUNT: 16 % (ref 11.6–15.1)
GFR SERPL CREATININE-BSD FRML MDRD: 84 ML/MIN/1.73SQ M
GLUCOSE SERPL-MCNC: 105 MG/DL (ref 65–140)
HCT VFR BLD AUTO: 39.9 % (ref 36.5–49.3)
HGB BLD-MCNC: 12.4 G/DL (ref 12–17)
IMM GRANULOCYTES # BLD AUTO: 0.01 THOUSAND/UL (ref 0–0.2)
IMM GRANULOCYTES NFR BLD AUTO: 0 % (ref 0–2)
LYMPHOCYTES # BLD AUTO: 1.2 THOUSANDS/ΜL (ref 0.6–4.47)
LYMPHOCYTES NFR BLD AUTO: 27 % (ref 14–44)
MCH RBC QN AUTO: 25.9 PG (ref 26.8–34.3)
MCHC RBC AUTO-ENTMCNC: 31.1 G/DL (ref 31.4–37.4)
MCV RBC AUTO: 83 FL (ref 82–98)
MONOCYTES # BLD AUTO: 0.55 THOUSAND/ΜL (ref 0.17–1.22)
MONOCYTES NFR BLD AUTO: 12 % (ref 4–12)
NEUTROPHILS # BLD AUTO: 2.51 THOUSANDS/ΜL (ref 1.85–7.62)
NEUTS SEG NFR BLD AUTO: 56 % (ref 43–75)
NRBC BLD AUTO-RTO: 0 /100 WBCS
PLATELET # BLD AUTO: 189 THOUSANDS/UL (ref 149–390)
PMV BLD AUTO: 9.8 FL (ref 8.9–12.7)
POTASSIUM SERPL-SCNC: 4.1 MMOL/L (ref 3.5–5.3)
PROT SERPL-MCNC: 6.2 G/DL (ref 6.4–8.4)
RBC # BLD AUTO: 4.79 MILLION/UL (ref 3.88–5.62)
SODIUM SERPL-SCNC: 139 MMOL/L (ref 135–147)
WBC # BLD AUTO: 4.49 THOUSAND/UL (ref 4.31–10.16)

## 2024-10-21 PROCEDURE — 74177 CT ABD & PELVIS W/CONTRAST: CPT

## 2024-10-21 PROCEDURE — 80053 COMPREHEN METABOLIC PANEL: CPT | Performed by: EMERGENCY MEDICINE

## 2024-10-21 PROCEDURE — 99284 EMERGENCY DEPT VISIT MOD MDM: CPT

## 2024-10-21 PROCEDURE — 85025 COMPLETE CBC W/AUTO DIFF WBC: CPT | Performed by: EMERGENCY MEDICINE

## 2024-10-21 PROCEDURE — 99285 EMERGENCY DEPT VISIT HI MDM: CPT | Performed by: EMERGENCY MEDICINE

## 2024-10-21 PROCEDURE — 36415 COLL VENOUS BLD VENIPUNCTURE: CPT | Performed by: EMERGENCY MEDICINE

## 2024-10-21 RX ORDER — OMEPRAZOLE 10 MG/1
10 CAPSULE, DELAYED RELEASE ORAL DAILY
Qty: 90 CAPSULE | Refills: 1 | Status: SHIPPED | OUTPATIENT
Start: 2024-10-21

## 2024-10-21 RX ADMIN — IOHEXOL 100 ML: 350 INJECTION, SOLUTION INTRAVENOUS at 19:18

## 2024-10-21 NOTE — TELEPHONE ENCOUNTER
Patient called. He wanted to know if he should go to the [facility] location in [facility] because he lives in [place]. Advised the patient it is best to go the the [facility] because it is closer for him.   He asked me to call ahead for him, but I explained we don't need to do that given the circumstance and the [facility] triages their patients and treats them accordingly.    He is concerned about his ride getting his granddaughter since he doesn't have a car now. He is also concerned because he hasn't eaten and he doesn't want to be with out food. Advised him to take something with him to eat or ask if he needs to have something. Expressed to him that the [facility] has always had great reviews with our patients and they will work as quickly as possible to provide him the care he needs. He states he is in a lot of pain but doesn't like to complain. He will go as soon as he can get a ride. I advised the sooner the better.  Pt verbalized understanding.

## 2024-10-21 NOTE — TELEPHONE ENCOUNTER
Patient would like an order place for a CAT SCAN pt stated she had mention this to PCP and would like the order fax to [facility] @[phone]. Pls advise patient when this is done.
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
## 2024-10-28 ENCOUNTER — OFFICE VISIT (OUTPATIENT)
Dept: FAMILY MEDICINE CLINIC | Facility: CLINIC | Age: 79
End: 2024-10-28
Payer: COMMERCIAL

## 2024-10-28 VITALS
HEIGHT: 69 IN | HEART RATE: 55 BPM | SYSTOLIC BLOOD PRESSURE: 138 MMHG | BODY MASS INDEX: 21.53 KG/M2 | DIASTOLIC BLOOD PRESSURE: 74 MMHG | WEIGHT: 145.4 LBS | OXYGEN SATURATION: 99 % | TEMPERATURE: 96.5 F

## 2024-10-28 DIAGNOSIS — M54.41 ACUTE RIGHT-SIDED LOW BACK PAIN WITH RIGHT-SIDED SCIATICA: Primary | ICD-10-CM

## 2024-10-28 PROCEDURE — 99214 OFFICE O/P EST MOD 30 MIN: CPT | Performed by: PHYSICIAN ASSISTANT

## 2024-10-28 PROCEDURE — G2211 COMPLEX E/M VISIT ADD ON: HCPCS | Performed by: PHYSICIAN ASSISTANT
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
## 2024-11-25 ENCOUNTER — TELEPHONE (OUTPATIENT)
Dept: FAMILY MEDICINE CLINIC | Facility: CLINIC | Age: 79
End: 2024-11-25

## 2024-11-25 NOTE — TELEPHONE ENCOUNTER
Attempted to contact Patient to reschedule his appt with Provider in Friday November 29th due to Provider's Father passing.  Please reschedule with a Resident  or with Lebron after December 11th.

## 2025-01-03 ENCOUNTER — TELEPHONE (OUTPATIENT)
Dept: FAMILY MEDICINE CLINIC | Facility: CLINIC | Age: 80
End: 2025-01-03

## 2025-01-03 ENCOUNTER — OFFICE VISIT (OUTPATIENT)
Dept: FAMILY MEDICINE CLINIC | Facility: CLINIC | Age: 80
End: 2025-01-03
Payer: COMMERCIAL

## 2025-01-03 VITALS
WEIGHT: 151.4 LBS | HEART RATE: 56 BPM | DIASTOLIC BLOOD PRESSURE: 82 MMHG | OXYGEN SATURATION: 94 % | BODY MASS INDEX: 22.36 KG/M2 | TEMPERATURE: 96.3 F | SYSTOLIC BLOOD PRESSURE: 116 MMHG

## 2025-01-03 DIAGNOSIS — M54.16 LUMBAR NERVE ROOT IMPINGEMENT: Primary | ICD-10-CM

## 2025-01-03 DIAGNOSIS — M25.561 ACUTE PAIN OF RIGHT KNEE: ICD-10-CM
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
## 2025-01-03 DIAGNOSIS — R19.09 MASS OF RIGHT INGUINAL REGION: ICD-10-CM

## 2025-01-03 DIAGNOSIS — R26.2 IMPAIRED AMBULATION: ICD-10-CM

## 2025-01-03 PROCEDURE — 99214 OFFICE O/P EST MOD 30 MIN: CPT | Performed by: FAMILY MEDICINE

## 2025-01-03 RX ORDER — OFLOXACIN 3 MG/ML
1 SOLUTION/ DROPS OPHTHALMIC 4 TIMES DAILY
COMMUNITY
Start: 2024-11-13
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
## 2025-01-06 ENCOUNTER — TELEPHONE (OUTPATIENT)
Age: 80
End: 2025-01-06

## 2025-01-06 NOTE — TELEPHONE ENCOUNTER
Pt called, stated he will like to set appt with [facility] [place] at [place], [place] for his back pain and will like to know if he needs another referral for it. Pt requested a call  back.     Please advise, thank you.

## 2025-01-10 NOTE — TELEPHONE ENCOUNTER
Lauren from [facility] called in regards to previous message left (Referenced below)   requesting a prior auth be done for that facility and a script be faxed to [phone].      Request is for MRI of the Right knee with and w/o contrast.   They need the script faxed and a Prior authorization from [employer] insurance.       Please note, patient already had an MRI of the lumbar spine on 11/ 14/24.

## 2025-01-10 NOTE — TELEPHONE ENCOUNTER
Lauren from [facility] called stating patient is scheduled there for an MRI lumbar spine and requesting a prior auth be done for that facility and a script be faxed to [phone].    Upon review of patient's chart it appears the MRI lumbar spine was done on 11/14/24 and the only MRI pending is for the right knee.  Lauren made aware and will further investigate and call back.     [facility] NPI [id number], Tax ID [id number]

## 2025-01-21 ENCOUNTER — TELEPHONE (OUTPATIENT)
Dept: FAMILY MEDICINE CLINIC | Facility: CLINIC | Age: 80
End: 2025-01-21

## 2025-01-21 DIAGNOSIS — S83.241A ACUTE MEDIAL MENISCUS TEAR OF RIGHT KNEE, INITIAL ENCOUNTER: Primary | ICD-10-CM

## 2025-01-21 NOTE — TELEPHONE ENCOUNTER
Called patient to relay test results below. Patient did not answer, left a voicemail to call the office at [phone].

## 2025-01-21 NOTE — TELEPHONE ENCOUNTER
----- Message from Kate Carlson MD sent at 1/21/2025  1:36 PM EST -----  Regarding: US groin and MRI knee results  Hi Ladies,    Can we please call patient and inform him that the MRI of the right knee unfortunately does show a tear of the medial meniscus (a piece of cartilage) that is inside the knee. Patient does require follow with Orthopedics at this time to discuss further management. Regarding his US of groin, there was no hernia, masses or lesions noted at this time. Please let me know if there are any further questions or concerns.    Best,

## 2025-01-23 NOTE — TELEPHONE ENCOUNTER
Called patient to relay test results below. Patient did not answer, left a voicemail to call the office at [phone].

## 2025-01-24 NOTE — TELEPHONE ENCOUNTER
Patient called to relay imaging results, no answer. Left voicemail to call office back. Sent [employer] message.

## 2025-02-03 ENCOUNTER — RA CDI HCC (OUTPATIENT)
Dept: OTHER | Facility: HOSPITAL | Age: 80
End: 2025-02-03

## 2025-02-03 NOTE — PROGRESS NOTES
HCC coding opportunities          Chart Reviewed number of suggestions sent to Provider: 1   K56.314    Please review and document on all HCC diagnoses, using M.E.A.T. criteria, as risk scores reset with the New Year.    Patients Insurance     Medicare Insurance: [employer]

## 2025-03-28 ENCOUNTER — PATIENT MESSAGE (OUTPATIENT)
Dept: FAMILY MEDICINE CLINIC | Facility: CLINIC | Age: 80
End: 2025-03-28

## 2025-03-30 DIAGNOSIS — E78.2 MIXED HYPERLIPIDEMIA: ICD-10-CM

## 2025-04-08 RX ORDER — ICOSAPENT ETHYL 1 G/1
1 CAPSULE ORAL 2 TIMES DAILY
Qty: 180 CAPSULE | Refills: 1 | OUTPATIENT
Start: 2025-04-08

## 2025-04-10 DIAGNOSIS — K21.9 GASTROESOPHAGEAL REFLUX DISEASE WITHOUT ESOPHAGITIS: ICD-10-CM

## 2025-04-10 DIAGNOSIS — E03.9 ACQUIRED HYPOTHYROIDISM: ICD-10-CM
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
## 2025-04-10 NOTE — TELEPHONE ENCOUNTER
Mail was returned to office .  Letter was sent to remind Patient of refill policy and a reminder of upcoming appointment.

## 2025-04-11 DIAGNOSIS — E78.2 MIXED HYPERLIPIDEMIA: ICD-10-CM

## 2025-04-21 RX ORDER — LEVOTHYROXINE SODIUM 50 UG/1
50 TABLET ORAL DAILY
Qty: 90 TABLET | Refills: 1 | OUTPATIENT
Start: 2025-04-21

## 2025-04-21 RX ORDER — OMEPRAZOLE 10 MG/1
10 CAPSULE, DELAYED RELEASE ORAL DAILY
Qty: 90 CAPSULE | Refills: 1 | OUTPATIENT
Start: 2025-04-21

## 2025-04-29 RX ORDER — ICOSAPENT ETHYL 1 G/1
1 CAPSULE ORAL 2 TIMES DAILY
Qty: 180 CAPSULE | Refills: 1 | OUTPATIENT
Start: 2025-04-29

## 2025-05-13 ENCOUNTER — APPOINTMENT (OUTPATIENT)
Dept: LAB | Facility: CLINIC | Age: 80
End: 2025-05-13
Attending: PHYSICIAN ASSISTANT
Payer: COMMERCIAL

## 2025-05-13 ENCOUNTER — OFFICE VISIT (OUTPATIENT)
Dept: FAMILY MEDICINE CLINIC | Facility: CLINIC | Age: 80
End: 2025-05-13
Payer: COMMERCIAL

## 2025-05-13 VITALS
HEIGHT: 69 IN | WEIGHT: 151.2 LBS | SYSTOLIC BLOOD PRESSURE: 120 MMHG | DIASTOLIC BLOOD PRESSURE: 72 MMHG | HEART RATE: 56 BPM | TEMPERATURE: 96.2 F | BODY MASS INDEX: 22.39 KG/M2

## 2025-05-13 DIAGNOSIS — J30.2 SEASONAL ALLERGIES: ICD-10-CM

## 2025-05-13 DIAGNOSIS — D64.9 ANEMIA, UNSPECIFIED TYPE: ICD-10-CM

## 2025-05-13 DIAGNOSIS — Z13.1 SCREENING FOR DIABETES MELLITUS: ICD-10-CM

## 2025-05-13 DIAGNOSIS — E55.9 VITAMIN D DEFICIENCY, UNSPECIFIED: ICD-10-CM

## 2025-05-13 DIAGNOSIS — C61 PROSTATE CANCER (HCC): ICD-10-CM

## 2025-05-13 DIAGNOSIS — E78.5 HYPERLIPIDEMIA, UNSPECIFIED HYPERLIPIDEMIA TYPE: ICD-10-CM

## 2025-05-13 DIAGNOSIS — R53.82 CHRONIC FATIGUE: ICD-10-CM

## 2025-05-13 DIAGNOSIS — Z12.5 ENCOUNTER FOR SCREENING FOR MALIGNANT NEOPLASM OF PROSTATE: ICD-10-CM

## 2025-05-13 DIAGNOSIS — E03.9 HYPOTHYROIDISM, UNSPECIFIED TYPE: ICD-10-CM

## 2025-05-13 DIAGNOSIS — Z00.00 MEDICARE ANNUAL WELLNESS VISIT, SUBSEQUENT: Primary | ICD-10-CM

## 2025-05-13 LAB
25(OH)D3 SERPL-MCNC: 32.7 NG/ML (ref 30–100)
ALBUMIN SERPL BCG-MCNC: 4.7 G/DL (ref 3.5–5)
ALP SERPL-CCNC: 89 U/L (ref 34–104)
ALT SERPL W P-5'-P-CCNC: 22 U/L (ref 7–52)
ANION GAP SERPL CALCULATED.3IONS-SCNC: 6 MMOL/L (ref 4–13)
AST SERPL W P-5'-P-CCNC: 22 U/L (ref 13–39)
BASOPHILS # BLD AUTO: 0.03 THOUSANDS/ÂΜL (ref 0–0.1)
BASOPHILS NFR BLD AUTO: 0 % (ref 0–1)
BILIRUB SERPL-MCNC: 0.78 MG/DL (ref 0.2–1)
BUN SERPL-MCNC: 20 MG/DL (ref 5–25)
CALCIUM SERPL-MCNC: 9.9 MG/DL (ref 8.4–10.2)
CHLORIDE SERPL-SCNC: 107 MMOL/L (ref 96–108)
CHOLEST SERPL-MCNC: 140 MG/DL (ref ?–200)
CO2 SERPL-SCNC: 28 MMOL/L (ref 21–32)
CREAT SERPL-MCNC: 0.79 MG/DL (ref 0.6–1.3)
EOSINOPHIL # BLD AUTO: 0.21 THOUSAND/ÂΜL (ref 0–0.61)
EOSINOPHIL NFR BLD AUTO: 3 % (ref 0–6)
ERYTHROCYTE [DISTWIDTH] IN BLOOD BY AUTOMATED COUNT: 17.6 % (ref 11.6–15.1)
EST. AVERAGE GLUCOSE BLD GHB EST-MCNC: 123 MG/DL
GFR SERPL CREATININE-BSD FRML MDRD: 86 ML/MIN/1.73SQ M
GLUCOSE P FAST SERPL-MCNC: 90 MG/DL (ref 65–99)
HBA1C MFR BLD: 5.9 %
HCT VFR BLD AUTO: 47.8 % (ref 36.5–49.3)
HDLC SERPL-MCNC: 53 MG/DL
HGB BLD-MCNC: 15.1 G/DL (ref 12–17)
IMM GRANULOCYTES # BLD AUTO: 0.02 THOUSAND/UL (ref 0–0.2)
IMM GRANULOCYTES NFR BLD AUTO: 0 % (ref 0–2)
LDLC SERPL CALC-MCNC: 70 MG/DL (ref 0–100)
LYMPHOCYTES # BLD AUTO: 1.03 THOUSANDS/ÂΜL (ref 0.6–4.47)
LYMPHOCYTES NFR BLD AUTO: 14 % (ref 14–44)
MCH RBC QN AUTO: 28.8 PG (ref 26.8–34.3)
MCHC RBC AUTO-ENTMCNC: 31.6 G/DL (ref 31.4–37.4)
MCV RBC AUTO: 91 FL (ref 82–98)
MONOCYTES # BLD AUTO: 0.94 THOUSAND/ÂΜL (ref 0.17–1.22)
MONOCYTES NFR BLD AUTO: 13 % (ref 4–12)
NEUTROPHILS # BLD AUTO: 5.26 THOUSANDS/ÂΜL (ref 1.85–7.62)
NEUTS SEG NFR BLD AUTO: 70 % (ref 43–75)
NRBC BLD AUTO-RTO: 0 /100 WBCS
PLATELET # BLD AUTO: 177 THOUSANDS/UL (ref 149–390)
PMV BLD AUTO: 10.3 FL (ref 8.9–12.7)
POTASSIUM SERPL-SCNC: 4 MMOL/L (ref 3.5–5.3)
PROT SERPL-MCNC: 7.2 G/DL (ref 6.4–8.4)
PSA SERPL-MCNC: <0.008 NG/ML (ref 0–4)
RBC # BLD AUTO: 5.24 MILLION/UL (ref 3.88–5.62)
SODIUM SERPL-SCNC: 141 MMOL/L (ref 135–147)
T4 FREE SERPL-MCNC: 1.07 NG/DL (ref 0.61–1.12)
TRIGL SERPL-MCNC: 87 MG/DL (ref ?–150)
TSH SERPL DL<=0.05 MIU/L-ACNC: 2.79 UIU/ML (ref 0.45–4.5)
WBC # BLD AUTO: 7.49 THOUSAND/UL (ref 4.31–10.16)

## 2025-05-13 PROCEDURE — 36415 COLL VENOUS BLD VENIPUNCTURE: CPT

## 2025-05-13 PROCEDURE — 82306 VITAMIN D 25 HYDROXY: CPT

## 2025-05-13 PROCEDURE — 83036 HEMOGLOBIN GLYCOSYLATED A1C: CPT

## 2025-05-13 PROCEDURE — 80053 COMPREHEN METABOLIC PANEL: CPT

## 2025-05-13 PROCEDURE — G0103 PSA SCREENING: HCPCS

## 2025-05-13 PROCEDURE — 85025 COMPLETE CBC W/AUTO DIFF WBC: CPT

## 2025-05-13 PROCEDURE — 80061 LIPID PANEL: CPT

## 2025-05-13 PROCEDURE — 84439 ASSAY OF FREE THYROXINE: CPT

## 2025-05-13 PROCEDURE — 99214 OFFICE O/P EST MOD 30 MIN: CPT | Performed by: PHYSICIAN ASSISTANT

## 2025-05-13 PROCEDURE — 84443 ASSAY THYROID STIM HORMONE: CPT

## 2025-05-13 PROCEDURE — G0439 PPPS, SUBSEQ VISIT: HCPCS | Performed by: PHYSICIAN ASSISTANT

## 2025-05-13 RX ORDER — FLUTICASONE PROPIONATE 50 MCG
1 SPRAY, SUSPENSION (ML) NASAL DAILY
Qty: 15.8 ML | Refills: 1 | Status: SHIPPED | OUTPATIENT
Start: 2025-05-13

## 2025-05-13 RX ORDER — CETIRIZINE HYDROCHLORIDE 10 MG/1
10 TABLET ORAL DAILY
Qty: 90 TABLET | Refills: 1 | Status: SHIPPED | OUTPATIENT
Start: 2025-05-13

## 2025-05-13 RX ORDER — MELOXICAM 7.5 MG/1
1 TABLET ORAL DAILY
COMMUNITY
Start: 2025-03-04

## 2025-05-13 NOTE — PATIENT INSTRUCTIONS
Medicare Preventive Visit Patient Instructions  Thank you for completing your Welcome to Medicare Visit or Medicare Annual Wellness Visit today. Your next wellness visit will be due in one year (5/14/2026).  The screening/preventive services that you may require over the next 5-10 years are detailed below. Some tests may not apply to you based off risk factors and/or age. Screening tests ordered at today's visit but not completed yet may show as past due. Also, please note that scanned in results may not display below.  Preventive Screenings:  Service Recommendations Previous Testing/Comments   Colorectal Cancer Screening  Colonoscopy    Fecal Occult Blood Test (FOBT)/Fecal Immunochemical Test (FIT)  Fecal DNA/Cologuard Test  Flexible Sigmoidoscopy Age: 45-75 years old   Colonoscopy: every 10 years (May be performed more frequently if at higher risk)  OR  FOBT/FIT: every 1 year  OR  Cologuard: every 3 years  OR  Sigmoidoscopy: every 5 years  Screening may be recommended earlier than age 45 if at higher risk for colorectal cancer. Also, an individualized decision between you and your healthcare provider will decide whether screening between the ages of 76-85 would be appropriate. Colonoscopy: 06/02/2020  FOBT/FIT: Not on file  Cologuard: Not on file  Sigmoidoscopy: Not on file          Prostate Cancer Screening Individualized decision between patient and health care provider in men between ages of 55-69   Medicare will cover every 12 months beginning on the day after your 50th birthday PSA: <0.1 ng/mL     History Prostate Cancer  Screening Not Indicated     Hepatitis C Screening Once for adults born between 1945 and 1965  More frequently in patients at high risk for Hepatitis C Hep C Antibody: 01/19/2023    Screening Current   Diabetes Screening 1-2 times per year if you're at risk for diabetes or have pre-diabetes Fasting glucose: 92 mg/dL (4/12/2024)  A1C: 5.8 % (4/12/2024)  Screening Current   Cholesterol  Screening Once every 5 years if you don't have a lipid disorder. May order more often based on risk factors. Lipid panel: 04/12/2024  Screening Not Indicated  History Lipid Disorder      Other Preventive Screenings Covered by Medicare:  Abdominal Aortic Aneurysm (AAA) Screening: covered once if your at risk. You're considered to be at risk if you have a family history of AAA or a male between the age of 65-75 who smoking at least 100 cigarettes in your lifetime.  Lung Cancer Screening: covers low dose CT scan once per year if you meet all of the following conditions: (1) Age 55-77; (2) No signs or symptoms of lung cancer; (3) Current smoker or have quit smoking within the last 15 years; (4) You have a tobacco smoking history of at least 20 pack years (packs per day x number of years you smoked); (5) You get a written order from a healthcare provider.  Glaucoma Screening: covered annually if you're considered high risk: (1) You have diabetes OR (2) Family history of glaucoma OR (3)  aged 50 and older OR (4)  American aged 65 and older  Osteoporosis Screening: covered every 2 years if you meet one of the following conditions: (1) Have a vertebral abnormality; (2) On glucocorticoid therapy for more than 3 months; (3) Have primary hyperparathyroidism; (4) On osteoporosis medications and need to assess response to drug therapy.  HIV Screening: covered annually if you're between the age of 15-65. Also covered annually if you are younger than 15 and older than 65 with risk factors for HIV infection. For pregnant patients, it is covered up to 3 times per pregnancy.    Immunizations:  Immunization Recommendations   Influenza Vaccine Annual influenza vaccination during flu season is recommended for all persons aged >= 6 months who do not have contraindications   Pneumococcal Vaccine   * Pneumococcal conjugate vaccine = PCV13 (Prevnar 13), PCV15 (Vaxneuvance), PCV20 (Prevnar 20)  * Pneumococcal  polysaccharide vaccine = PPSV23 (Pneumovax) Adults 19-65 yo with certain risk factors or if 65+ yo  If never received any pneumonia vaccine: recommend Prevnar 20 (PCV20)  Give PCV20 if previously received 1 dose of PCV13 or PPSV23   Hepatitis B Vaccine 3 dose series if at intermediate or high risk (ex: diabetes, end stage renal disease, liver disease)   Respiratory syncytial virus (RSV) Vaccine - COVERED BY MEDICARE PART D  * RSVPreF3 (Arexvy) CDC recommends that adults 60 years of age and older may receive a single dose of RSV vaccine using shared clinical decision-making (SCDM)   Tetanus (Td) Vaccine - COST NOT COVERED BY MEDICARE PART B Following completion of primary series, a booster dose should be given every 10 years to maintain immunity against tetanus. Td may also be given as tetanus wound prophylaxis.   Tdap Vaccine - COST NOT COVERED BY MEDICARE PART B Recommended at least once for all adults. For pregnant patients, recommended with each pregnancy.   Shingles Vaccine (Shingrix) - COST NOT COVERED BY MEDICARE PART B  2 shot series recommended in those 19 years and older who have or will have weakened immune systems or those 50 years and older     Health Maintenance Due:      Topic Date Due   • Hepatitis C Screening  Completed   • Colorectal Cancer Screening  Discontinued     Immunizations Due:      Topic Date Due   • COVID-19 Vaccine (5 - 2024-25 season) 09/01/2024     Advance Directives   What are advance directives?  Advance directives are legal documents that state your wishes and plans for medical care. These plans are made ahead of time in case you lose your ability to make decisions for yourself. Advance directives can apply to any medical decision, such as the treatments you want, and if you want to donate organs.   What are the types of advance directives?  There are many types of advance directives, and each state has rules about how to use them. You may choose a combination of any of the  following:  Living will:  This is a written record of the treatment you want. You can also choose which treatments you do not want, which to limit, and which to stop at a certain time. This includes surgery, medicine, IV fluid, and tube feedings.   Durable power of  for healthcare (DPAHC):  This is a written record that states who you want to make healthcare choices for you when you are unable to make them for yourself. This person, called a proxy, is usually a family member or a friend. You may choose more than 1 proxy.  Do not resuscitate (DNR) order:  A DNR order is used in case your heart stops beating or you stop breathing. It is a request not to have certain forms of treatment, such as CPR. A DNR order may be included in other types of advance directives.  Medical directive:  This covers the care that you want if you are in a coma, near death, or unable to make decisions for yourself. You can list the treatments you want for each condition. Treatment may include pain medicine, surgery, blood transfusions, dialysis, IV or tube feedings, and a ventilator (breathing machine).  Values history:  This document has questions about your views, beliefs, and how you feel and think about life. This information can help others choose the care that you would choose.  Why are advance directives important?  An advance directive helps you control your care. Although spoken wishes may be used, it is better to have your wishes written down. Spoken wishes can be misunderstood, or not followed. Treatments may be given even if you do not want them. An advance directive may make it easier for your family to make difficult choices about your care.       © Copyright [employer] 2018 Information is for End User's use only and may not be sold, redistributed or otherwise used for commercial purposes. All illustrations and images included in CareNotes® are the copyrighted property of A.D.A.M., Inc. or [employer]

## 2025-05-13 NOTE — ASSESSMENT & PLAN NOTE
- S/p prostatectomy in 2020 for prostate cancer    - Last PSA 04/2024 less than 0.02    - No longer following urology at [facility]    - Will recheck PSA   Orders:    PSA, Total Screen; Future

## 2025-05-13 NOTE — PROGRESS NOTES
Name: Bryan Cooper      : 1945      MRN: [id number]  Encounter Provider: Lebron Bartlett PA-C  Encounter Date: 2025   Encounter department: [facility] PRIMARY CARE  :  Assessment & Plan  Prostate cancer (HCC)     - S/p prostatectomy in  for prostate cancer    - Last PSA 2024 less than 0.02    - No longer following urology at [facility]    - Will recheck PSA   Orders:    PSA, Total Screen; Future    Encounter for screening for malignant neoplasm of prostate    Orders:    PSA, Total Screen; Future    Medicare annual wellness visit, subsequent         Chronic fatigue     - Pt notes feeling slightly more fatigued than usual and would like labs drawn    - Last CBC showed normal hemoglobin but cells trending microcytic so will recheck CBC and iron study along with vitamin D level   Orders:    Vitamin D 25 hydroxy; Future    CBC and differential; Future    Comprehensive metabolic panel; Future    Hyperlipidemia, unspecified hyperlipidemia type    Orders:    Lipid Panel with Direct LDL reflex; Future    Hypothyroidism, unspecified type    Orders:    TSH, 3rd generation; Future    T4, free; Future    Screening for diabetes mellitus    Orders:    Hemoglobin A1C; Future    Vitamin D deficiency, unspecified    Orders:    Vitamin D 25 hydroxy; Future    Seasonal allergies     - Patient notes intermittent runny nose, itchy eyes since seasons changed    - Will initiate daily non sedating anti histamine and flonase QD   Orders:    cetirizine (ZyrTEC) 10 mg tablet; Take 1 tablet (10 mg total) by mouth daily    fluticasone (FLONASE) 50 mcg/act nasal spray; 1 spray into each nostril daily       Preventive health issues were discussed with patient, and age appropriate screening tests were ordered as noted in patient's After Visit Summary. Personalized health advice and appropriate referrals for health education or preventive services given if needed, as noted in patient's After Visit  Summary.    History of Present Illness     HPI   Patient Care Team:  Kate Carlson MD as PCP - General (Family Medicine)    Review of Systems   Constitutional:  Negative for chills and fever.   HENT:  Positive for rhinorrhea. Negative for ear pain and sore throat.    Eyes:  Positive for itching. Negative for pain and visual disturbance.   Respiratory:  Negative for cough and shortness of breath.    Cardiovascular:  Negative for chest pain and palpitations.   Gastrointestinal:  Negative for abdominal pain and vomiting.   Genitourinary:  Negative for dysuria and hematuria.   Musculoskeletal:  Negative for arthralgias and back pain.   Skin:  Negative for color change and rash.   Neurological:  Negative for seizures and syncope.   All other systems reviewed and are negative.    Medical History Reviewed by provider this encounter:  Tobacco  Allergies  Meds  Problems  Med Hx  Surg Hx  Fam Hx       Annual Wellness Visit Questionnaire   Bryan is here for his Subsequent Wellness visit. Last Medicare Wellness visit information reviewed, patient interviewed and updates made to the record today.      Health Risk Assessment:   Patient rates overall health as very good. Patient feels that their physical health rating is slightly worse. Patient is satisfied with their life. Eyesight was rated as same. Hearing was rated as same. Patient feels that their emotional and mental health rating is same. Patients states they are never, rarely angry. Patient states they are sometimes unusually tired/fatigued. Pain experienced in the last 7 days has been some. Patient's pain rating has been 6/10. Patient states that he has experienced no weight loss or gain in last 6 months.     Depression Screening:   PHQ-2 Score: 0      Fall Risk Screening:   In the past year, patient has experienced: no history of falling in past year      Home Safety:  Patient has trouble with stairs inside or outside of their home. Patient has working  smoke alarms and has working carbon monoxide detector. Home safety hazards include: none.     Nutrition:   Current diet is Regular.     Medications:   Patient is currently taking over-the-counter supplements. OTC medications include: see medication list. Patient is able to manage medications.     Activities of Daily Living (ADLs)/Instrumental Activities of Daily Living (IADLs):   Walk and transfer into and out of bed and chair?: Yes  Dress and groom yourself?: Yes    Bathe or shower yourself?: Yes    Feed yourself? Yes  Do your laundry/housekeeping?: No  Manage your money, pay your bills and track your expenses?: Yes  Make your own meals?: Yes      Previous Hospitalizations:   Any hospitalizations or ED visits within the last 12 months?: Yes    How many hospitalizations have you had in the last year?: 3-4    Advance Care Planning:   Living will: Yes    Durable POA for healthcare: Yes    Advanced directive: Yes    Advanced directive counseling given: Yes    ACP document given: Yes      Preventive Screenings      Cardiovascular Screening:    General: Screening Not Indicated and History Lipid Disorder      Diabetes Screening:     General: Screening Current      Colorectal Cancer Screening:     General: Screening Not Indicated      Prostate Cancer Screening:    General: History Prostate Cancer and Risks and Benefits Discussed      Osteoporosis Screening:    General: Screening Not Indicated      Abdominal Aortic Aneurysm (AAA) Screening:        General: Screening Not Indicated      Lung Cancer Screening:     General: Screening Not Indicated      Hepatitis C Screening:    General: Screening Current    Screening, Brief Intervention, and Referral to Treatment (SBIRT)     Screening  Typical number of drinks in a day: 0  Typical number of drinks in a week: 0  Interpretation: Low risk drinking behavior.    AUDIT-C Screenin) How often did you have a drink containing alcohol in the past year? never  2) How many drinks did  "you have on a typical day when you were drinking in the past year? 0  3) How often did you have 6 or more drinks on one occasion in the past year? never    AUDIT-C Score: 0  Interpretation: Score 0-3 (male): Negative screen for alcohol misuse    Single Item Drug Screening:  How often have you used an illegal drug (including marijuana) or a prescription medication for non-medical reasons in the past year? never    Single Item Drug Screen Score: 0  Interpretation: Negative screen for possible drug use disorder    Other Counseling Topics:   Calcium and vitamin D intake.     Social Drivers of Health     Financial Resource Strain: Low Risk  (1/19/2023)    Overall Financial Resource Strain (CARDIA)     Difficulty of Paying Living Expenses: Not hard at all   Food Insecurity: No Food Insecurity (5/13/2025)    Hunger Vital Sign     Worried About Running Out of Food in the Last Year: Never true     Ran Out of Food in the Last Year: Never true   Transportation Needs: No Transportation Needs (5/13/2025)    PRAPARE - Transportation     Lack of Transportation (Medical): No     Lack of Transportation (Non-Medical): No   Housing Stability: Low Risk  (5/13/2025)    Housing Stability Vital Sign     Unable to Pay for Housing in the Last Year: No     Number of Times Moved in the Last Year: 0     Homeless in the Last Year: No   Utilities: Not At Risk (5/13/2025)    [facility] Utilities     Threatened with loss of utilities: No     No results found.    Objective   /72 (BP Location: Left arm)   Pulse 56   Temp (!) 96.2 °F (35.7 °C)   Ht 5' 9\" (1.753 m)   Wt 68.6 kg (151 lb 3.2 oz)   BMI 22.33 kg/m²     Physical Exam  Constitutional:       Appearance: Normal appearance.   HENT:      Head: Normocephalic.      Right Ear: External ear normal.      Left Ear: External ear normal.      Nose: Nose normal.      Mouth/Throat:      Mouth: Mucous membranes are moist.      Pharynx: Oropharynx is clear. Postnasal drip present.   Eyes:      " Conjunctiva/sclera: Conjunctivae normal.   Cardiovascular:      Rate and Rhythm: Normal rate and regular rhythm.      Heart sounds: Normal heart sounds.   Pulmonary:      Effort: Pulmonary effort is normal.      Breath sounds: Normal breath sounds.   Abdominal:      General: Bowel sounds are normal.      Palpations: Abdomen is soft.   Neurological:      Mental Status: He is alert and oriented to person, place, and time.   Psychiatric:         Behavior: Behavior normal.

## 2025-05-16 ENCOUNTER — TELEPHONE (OUTPATIENT)
Dept: FAMILY MEDICINE CLINIC | Facility: CLINIC | Age: 80
End: 2025-05-16
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
## 2025-05-18 ENCOUNTER — RESULTS FOLLOW-UP (OUTPATIENT)
Dept: FAMILY MEDICINE CLINIC | Facility: CLINIC | Age: 80
End: 2025-05-18

## 2025-05-19 NOTE — TELEPHONE ENCOUNTER
----- Message from Lebron Bartlett PA-C sent at 5/18/2025  4:30 PM EDT -----  Hi ladies,    Please call Bryan to inform that labs look good. Liver, kidney, thyroid function all look great. Vitamin D levels normal. If he has any questions or concerns, please let me know !     Lebron   ----- Message -----  From: Lab, Background User  Sent: 5/13/2025   1:55 PM EDT  To: Lebron Bartlett PA-C

## 2025-05-19 NOTE — TELEPHONE ENCOUNTER
Patient was called and labs were reviewed . Patient is to call the office if any questions should arise .

## 2025-05-20 ENCOUNTER — CONSULT (OUTPATIENT)
Dept: FAMILY MEDICINE CLINIC | Facility: CLINIC | Age: 80
End: 2025-05-20
Payer: COMMERCIAL

## 2025-05-20 VITALS
DIASTOLIC BLOOD PRESSURE: 74 MMHG | TEMPERATURE: 96.3 F | SYSTOLIC BLOOD PRESSURE: 106 MMHG | BODY MASS INDEX: 22.59 KG/M2 | HEART RATE: 67 BPM | OXYGEN SATURATION: 98 % | WEIGHT: 153 LBS

## 2025-05-20 DIAGNOSIS — R05.3 CHRONIC COUGH: Primary | ICD-10-CM

## 2025-05-20 DIAGNOSIS — Z01.818 PRE-OP EXAMINATION: Primary | ICD-10-CM

## 2025-05-20 DIAGNOSIS — S83.206D TEAR OF MENISCUS OF RIGHT KNEE AS CURRENT INJURY, UNSPECIFIED MENISCUS, UNSPECIFIED TEAR TYPE, SUBSEQUENT ENCOUNTER: ICD-10-CM

## 2025-05-20 PROCEDURE — 99213 OFFICE O/P EST LOW 20 MIN: CPT | Performed by: PHYSICIAN ASSISTANT

## 2025-05-20 RX ORDER — HYDROCORTISONE 1 G/100G
1 CREAM TOPICAL EVERY 2 HOUR PRN
Qty: 20 ML | Refills: 0 | Status: SHIPPED | OUTPATIENT
Start: 2025-05-20

## 2025-05-20 RX ORDER — BENZONATATE 100 MG/1
100 CAPSULE ORAL 3 TIMES DAILY PRN
Qty: 40 CAPSULE | Refills: 0 | Status: SHIPPED | OUTPATIENT
Start: 2025-05-20

## 2025-05-20 NOTE — PROGRESS NOTES
PRE-OPERATIVE EXAMINATION  Bryan Cooper  1945    Bryan Cooper is a 79 y.o. male with R torn meniscus who is planning to undergo meniscal repair under general by Dr. Paul on (within 30 days of this appointment). The procedure is indicated for the following condition: R torn meniscus. Patient has not had complications with anesthesia in the past.    ROS:   Chest pain: no   Shortness of breath: no  Shortness of breath with exertion: no  Orthopnea: no  Dizziness: no  Unexplained weight change: no    PMH:  CAD: no  HTN: no  CKD: no  DM: no on insulin: no  History of CVA: no    He  reports that he has never smoked. He has never been exposed to tobacco smoke. He has never used smokeless tobacco. He reports that he does not currently use alcohol after a past usage of about 1.0 standard drink of alcohol per week. He reports that he does not use drugs.    /74   Pulse 67   Temp (!) 96.3 °F (35.7 °C)   Wt 69.4 kg (153 lb)   SpO2 98%   BMI 22.59 kg/m²   Physical Exam  Constitutional:       Appearance: Normal appearance.   HENT:      Head: Normocephalic.      Right Ear: External ear normal.      Left Ear: External ear normal.      Nose: Nose normal.      Mouth/Throat:      Mouth: Mucous membranes are moist.      Pharynx: Oropharynx is clear.     Eyes:      Conjunctiva/sclera: Conjunctivae normal.       Cardiovascular:      Rate and Rhythm: Normal rate and regular rhythm.      Heart sounds: Normal heart sounds.   Pulmonary:      Effort: Pulmonary effort is normal.      Breath sounds: Normal breath sounds.   Abdominal:      General: Bowel sounds are normal.      Palpations: Abdomen is soft.     Neurological:      Mental Status: He is alert and oriented to person, place, and time.     Psychiatric:         Behavior: Behavior normal.         Revised Cardiac Risk Index (RCRI) for Pre-Operative Risk   (estimates risk of cardiac complications after noncardiac surgery)    High-risk surgery: No 0 / Yes  +1  Intraperitoneal, intrathoracic, suprainguinal vascular  History of ischemic heart disease: No 0 / Yes +1  Hx of MI, (+) exercise test, current chest pain considered due to myocardial ischemia, use of nitrate therapy or ECG with pathological Q waves)  History of CHF: No 0 / Yes +1  Pulmonary edema, B/L rales or S3 gallop; MARRERO, orthopnea, PND, CXR showing pulmonary vascular redistribution)  History of cerebrovascular disease: No 0 / Yes +1  Prior TIA or stroke  Pre-operative treatment with insulin: No 0 / Yes +1  Pre-operative creatinine >2 mg/dL: No 0 / Yes +1    RCRI Scorin points: Class I Risk, 3.9% 30-day risk of death, MI, or cardiac arrest  1 point: Class II Risk, 6.0% 30-day risk of death, MI, or cardiac arrest  2 points: Class III Risk, 10.1% 30-day risk of death, MI, or cardiac arrest  3 points: Class IV Risk, 15% 30-day risk of death, MI, or cardiac arrest  4 points: Class IV Risk, 15% 30-day risk of death, MI, or cardiac arrest  5 points: Class IV Risk, 15% 30-day risk of death, MI, or cardiac arrest  6 points: Class IV Risk, 15% 30-day risk of death, MI, or cardiac arrest    Lab Results   Component Value Date    CREATININE 0.79 2025       There are no diagnoses linked to this encounter.    Recommendations:  Bryan Cooper is undergoing an elective Low Risk surgery. He is RCRI class I risk (0 points) with 3.9% 30-day risk of death, MI, or cardiac arrest. He may proceed with surgery as planned without further workup.Pre-operative form completed and faxed today to office as requested.

## 2025-05-23 ENCOUNTER — TELEPHONE (OUTPATIENT)
Age: 80
End: 2025-05-23

## 2025-05-23 DIAGNOSIS — Z01.818 PRE-OP EXAMINATION: Primary | ICD-10-CM

## 2025-05-23 DIAGNOSIS — Z86.718 HISTORY OF DVT (DEEP VEIN THROMBOSIS): ICD-10-CM

## 2025-05-23 NOTE — TELEPHONE ENCOUNTER
Pt returned call.  Asked for order to please be faxed over to Dr. Luis Armando Barbour at the [facility].  Their office number is [phone] and their fax number is [phone].

## 2025-05-23 NOTE — TELEPHONE ENCOUNTER
Patient is requesting a referral be placed to vascular. He states he is in need of a surgery clearance from them but will need a referral. Patient is having Right leg surgery and Spine injections. Surgery date is TBD pending clearance. Please advise.

## 2025-05-23 NOTE — TELEPHONE ENCOUNTER
Patient was called and a message was left on  voicemail that the referral was placed by Provider and we can fax to whoever Patient chooses to go to.  Both Phone #'s were called and messages were left.

## 2025-05-27 ENCOUNTER — TELEPHONE (OUTPATIENT)
Age: 80
End: 2025-05-27

## 2025-05-28 ENCOUNTER — TELEPHONE (OUTPATIENT)
Age: 80
End: 2025-05-28

## 2025-05-28 NOTE — TELEPHONE ENCOUNTER
Returned call regarding information needed for patient.  DVT findings were done in 5/13/2022 with [facility] and established with our office on 12/9/2022 no other notes or imaging found.     Left message for [facility] to inform them of what I found in chart.

## 2025-05-28 NOTE — TELEPHONE ENCOUNTER
Angelito, from [facility] for Dr. Barbour, called in stating they require recent imaging of pt to confirm if he indeed has blood clots or DVT. States they saw the one from 01/09/25, but it resulted unremarkable. Angelito states if pt doesn't have any blood clots or DVT there would be no reason for him to see Dr. Barbour.     Angelito expressed pt would need a VENOUS DUPLEX DOPPLER to check for any clots or DVT.     Lastly, to please fax them the referral.     Fax#: [phone]    For any further information or questions, please contact Angelito. Thank you!    Angelito: [phone], opt 1

## 2025-05-28 NOTE — TELEPHONE ENCOUNTER
Courtney from Dr. Luis Armando Barbour office  [facility], calling needs office notes, imaging,  reports for DVT and referral     Cb # [phone] opt 1  Fax# [phone]

## 2025-05-29 NOTE — TELEPHONE ENCOUNTER
Called [facility] and first available was July 30th at [facility].     Called patient to inform him the above information. Patient stated that he cannot wait that long and will try to go else where. Patient will keep us updated if he figures it out.

## 2025-05-29 NOTE — TELEPHONE ENCOUNTER
Called patient to inform her of the scheduled appointment with vascular July 8th at 9:40 AM. That was the soonest appointment available and did ask to be placed on a cancellation list. Patient will have to do another pre op with our office since it will not be within 30 days of surgery. Patient may reach out to surgeon to see if there is anything else that can be done. Patient did not answer, left a voicemail to call the office at [phone].

## 2025-05-29 NOTE — TELEPHONE ENCOUNTER
Returned call to [facility] regarding testing. It is recommended from Dr. Ashwin Johnson with [facility] that he is cleared by vascular surgery. Appointment scheduled for 7/8 at 9:40 AM

## 2025-05-29 NOTE — TELEPHONE ENCOUNTER
Patient called back and was read the message from Malathi.  Pt said he is in a lot of pain and can't wait until July 8 with vascular.  He is asking if the office can call  [facility] vascular in [place] to see if they can get him in sooner.  Pt would like a call back.

## 2025-05-30 NOTE — TELEPHONE ENCOUNTER
Angelito from [facility] vascular surgery called.    Patient is scheduled for a consultation on 7/8/25 with Dr. Barbour.    They are requesting the following imaging to be completed prior to patients appointment.    Vascular Arterial Duplex Lower Extremity B/L Complete     Also she is requesting the actual referral that was placed be faxed over to her.   Fax # [phone]    Call back number for any questions or concerns # [phone] option 1

## 2025-06-01 DIAGNOSIS — Z86.718 HISTORY OF DVT (DEEP VEIN THROMBOSIS): Primary | ICD-10-CM

## 2025-06-02 NOTE — TELEPHONE ENCOUNTER
Called patient to inform him to schedule his imaging below that the vascular surgeon be completed prior to appointment. Patient did not answer, left a voicemail to call the office at [phone].
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
## 2025-06-13 ENCOUNTER — TELEPHONE (OUTPATIENT)
Age: 80
End: 2025-06-13

## 2025-06-13 DIAGNOSIS — E78.2 MIXED HYPERLIPIDEMIA: ICD-10-CM

## 2025-06-13 NOTE — TELEPHONE ENCOUNTER
Patient states he needs refills of Fish Oil and prevacid, not on med list. Patient requests call back to advise.

## 2025-06-16 RX ORDER — ICOSAPENT ETHYL 1 G/1
1 CAPSULE ORAL 2 TIMES DAILY
Qty: 180 CAPSULE | Refills: 1 | Status: SHIPPED | OUTPATIENT
Start: 2025-06-16

## 2025-06-16 NOTE — TELEPHONE ENCOUNTER
Called patient to inform him that fish oil was sent to pharmacy and prevacid could be purchased OTC. Patient did not answer, left a voicemail to call the office at [phone].

## 2025-06-16 NOTE — TELEPHONE ENCOUNTER
Fish oil is on med list, listed as Icosapent Ethyl 1g CAP. Prevacid is OTC and thus can be purchased OTC.

## 2025-06-28 DIAGNOSIS — K21.9 GASTROESOPHAGEAL REFLUX DISEASE WITHOUT ESOPHAGITIS: ICD-10-CM

## 2025-06-30 RX ORDER — OMEPRAZOLE 10 MG/1
10 CAPSULE, DELAYED RELEASE ORAL DAILY
Qty: 90 CAPSULE | Refills: 1 | Status: SHIPPED | OUTPATIENT
Start: 2025-06-30

## 2025-07-01 ENCOUNTER — TELEPHONE (OUTPATIENT)
Age: 80
End: 2025-07-01

## 2025-07-01 NOTE — TELEPHONE ENCOUNTER
Patient called and said Dr. López's office has not received his clearances.  He had a pre op appt on 5/20 with Lebron Bartlett.  He's asking that all clearances be faxed to attention Maria Luisa Rossd to fax# [phone].  Maria Luisa's phone number is [phone] if you have any questions.  Pt would like a call back letting him know the fax was sent.  He said to try his cell first and if he doesn't answer to call his work number and leave a message.

## 2025-07-01 NOTE — TELEPHONE ENCOUNTER
Pre-op Clearance printed and faxed to Maria Luisa Jules at [phone].  Received fax confirmation and placed in July fax folder.

## 2025-07-02 ENCOUNTER — TELEPHONE (OUTPATIENT)
Age: 80
End: 2025-07-02

## 2025-07-02 NOTE — TELEPHONE ENCOUNTER
Patient called to schedule a pre-op clearance. He is having surgery done on his left leg 7/24/25. Patient needs to have blood work done prior and will contact the surgeon for the script.  Patient is scheduled for pre-op clearance appointment with Lebron on Wednesday, 7/16/25 at 2 pm. Patient will also bring insurance information from [employer]

## 2025-07-10 ENCOUNTER — TELEPHONE (OUTPATIENT)
Age: 80
End: 2025-07-10

## 2025-07-10 DIAGNOSIS — I21.A9 OTHER MYOCARDIAL INFARCTION TYPE (HCC): ICD-10-CM

## 2025-07-10 DIAGNOSIS — E78.2 MIXED HYPERLIPIDEMIA: Primary | ICD-10-CM

## 2025-07-10 DIAGNOSIS — E03.9 HYPOTHYROIDISM, UNSPECIFIED TYPE: ICD-10-CM

## 2025-07-10 DIAGNOSIS — K21.9 GASTROESOPHAGEAL REFLUX DISEASE WITHOUT ESOPHAGITIS: ICD-10-CM

## 2025-07-10 NOTE — TELEPHONE ENCOUNTER
Patient called,    Patient stated. Dr Paul office requesting update blood work- his blood work is not current.    Patient is scheduled for medical clearance on 7/16 with Lebron MERCADO     Patient requesting  and order  for blood work for his upcoming surgery on 7/24/25.    Patient also stated he will fax insurance information from [employer].    Please advise and notify patient please. Thank you    Bryan [phone]

## 2025-07-10 NOTE — TELEPHONE ENCOUNTER
PCP has placed orders for blood work at this time, please call patient and have him complete ASAP prior to his pre-op appt next week.    Thanks,

## 2025-07-11 ENCOUNTER — TELEPHONE (OUTPATIENT)
Age: 80
End: 2025-07-11

## 2025-07-11 NOTE — TELEPHONE ENCOUNTER
Called patient to inform him that labs are placed for him to complete prior to his appointment on 7/16. Patient did not answer, left a voicemail to call the office at [phone].

## 2025-07-11 NOTE — TELEPHONE ENCOUNTER
Patient's friend Ivy called,    States they received a bill for wellness visit on 5/13/25. Patients insurance is out of network.  Patient was seen on 5/20 for pre -op clearance states claims are to be sent to [employer].    Warm transfer to office Tesha states she will send message to Management- will have to wait until she returns to the office on Tuesday 7/15/2025.    Then management will call patient.    Informed Ivy she acknowledged.    Please advise and notify Ivy, thank you.     Ivy -- [phone]

## 2025-07-12 ENCOUNTER — APPOINTMENT (OUTPATIENT)
Dept: LAB | Facility: CLINIC | Age: 80
End: 2025-07-12
Attending: FAMILY MEDICINE
Payer: COMMERCIAL

## 2025-07-12 DIAGNOSIS — E78.2 MIXED HYPERLIPIDEMIA: ICD-10-CM

## 2025-07-12 DIAGNOSIS — E03.9 HYPOTHYROIDISM, UNSPECIFIED TYPE: ICD-10-CM

## 2025-07-12 DIAGNOSIS — I21.A9 OTHER MYOCARDIAL INFARCTION TYPE (HCC): ICD-10-CM

## 2025-07-12 DIAGNOSIS — K21.9 GASTROESOPHAGEAL REFLUX DISEASE WITHOUT ESOPHAGITIS: ICD-10-CM

## 2025-07-12 LAB
ALBUMIN SERPL BCG-MCNC: 4.7 G/DL (ref 3.5–5)
ALP SERPL-CCNC: 81 U/L (ref 34–104)
ALT SERPL W P-5'-P-CCNC: 17 U/L (ref 7–52)
ANION GAP SERPL CALCULATED.3IONS-SCNC: 11 MMOL/L (ref 4–13)
AST SERPL W P-5'-P-CCNC: 25 U/L (ref 13–39)
BASOPHILS # BLD AUTO: 0.04 THOUSANDS/ÂΜL (ref 0–0.1)
BASOPHILS NFR BLD AUTO: 1 % (ref 0–1)
BILIRUB SERPL-MCNC: 0.66 MG/DL (ref 0.2–1)
BUN SERPL-MCNC: 21 MG/DL (ref 5–25)
CALCIUM SERPL-MCNC: 10.2 MG/DL (ref 8.4–10.2)
CHLORIDE SERPL-SCNC: 104 MMOL/L (ref 96–108)
CHOLEST SERPL-MCNC: 231 MG/DL (ref ?–200)
CO2 SERPL-SCNC: 26 MMOL/L (ref 21–32)
CREAT SERPL-MCNC: 0.71 MG/DL (ref 0.6–1.3)
EOSINOPHIL # BLD AUTO: 0.13 THOUSAND/ÂΜL (ref 0–0.61)
EOSINOPHIL NFR BLD AUTO: 2 % (ref 0–6)
ERYTHROCYTE [DISTWIDTH] IN BLOOD BY AUTOMATED COUNT: 15.2 % (ref 11.6–15.1)
EST. AVERAGE GLUCOSE BLD GHB EST-MCNC: 123 MG/DL
GFR SERPL CREATININE-BSD FRML MDRD: 90 ML/MIN/1.73SQ M
GLUCOSE P FAST SERPL-MCNC: 85 MG/DL (ref 65–99)
HBA1C MFR BLD: 5.9 %
HCT VFR BLD AUTO: 50.2 % (ref 36.5–49.3)
HDLC SERPL-MCNC: 53 MG/DL
HGB BLD-MCNC: 16.1 G/DL (ref 12–17)
IMM GRANULOCYTES # BLD AUTO: 0.02 THOUSAND/UL (ref 0–0.2)
IMM GRANULOCYTES NFR BLD AUTO: 0 % (ref 0–2)
LDLC SERPL CALC-MCNC: 158 MG/DL (ref 0–100)
LYMPHOCYTES # BLD AUTO: 1.18 THOUSANDS/ÂΜL (ref 0.6–4.47)
LYMPHOCYTES NFR BLD AUTO: 20 % (ref 14–44)
MCH RBC QN AUTO: 29.7 PG (ref 26.8–34.3)
MCHC RBC AUTO-ENTMCNC: 32.1 G/DL (ref 31.4–37.4)
MCV RBC AUTO: 92 FL (ref 82–98)
MONOCYTES # BLD AUTO: 0.71 THOUSAND/ÂΜL (ref 0.17–1.22)
MONOCYTES NFR BLD AUTO: 12 % (ref 4–12)
NEUTROPHILS # BLD AUTO: 3.88 THOUSANDS/ÂΜL (ref 1.85–7.62)
NEUTS SEG NFR BLD AUTO: 65 % (ref 43–75)
NRBC BLD AUTO-RTO: 0 /100 WBCS
PLATELET # BLD AUTO: 209 THOUSANDS/UL (ref 149–390)
PMV BLD AUTO: 11.6 FL (ref 8.9–12.7)
POTASSIUM SERPL-SCNC: 4.3 MMOL/L (ref 3.5–5.3)
PROT SERPL-MCNC: 7.2 G/DL (ref 6.4–8.4)
RBC # BLD AUTO: 5.43 MILLION/UL (ref 3.88–5.62)
SODIUM SERPL-SCNC: 141 MMOL/L (ref 135–147)
T4 FREE SERPL-MCNC: 0.99 NG/DL (ref 0.61–1.12)
TRIGL SERPL-MCNC: 99 MG/DL (ref ?–150)
TSH SERPL DL<=0.05 MIU/L-ACNC: 2.81 UIU/ML (ref 0.45–4.5)
WBC # BLD AUTO: 5.96 THOUSAND/UL (ref 4.31–10.16)

## 2025-07-12 PROCEDURE — 36415 COLL VENOUS BLD VENIPUNCTURE: CPT

## 2025-07-12 PROCEDURE — 84439 ASSAY OF FREE THYROXINE: CPT

## 2025-07-12 PROCEDURE — 80053 COMPREHEN METABOLIC PANEL: CPT

## 2025-07-12 PROCEDURE — 83036 HEMOGLOBIN GLYCOSYLATED A1C: CPT

## 2025-07-12 PROCEDURE — 84443 ASSAY THYROID STIM HORMONE: CPT

## 2025-07-12 PROCEDURE — 85025 COMPLETE CBC W/AUTO DIFF WBC: CPT

## 2025-07-12 PROCEDURE — 80061 LIPID PANEL: CPT

## 2025-07-16 ENCOUNTER — CONSULT (OUTPATIENT)
Dept: FAMILY MEDICINE CLINIC | Facility: CLINIC | Age: 80
End: 2025-07-16
Payer: COMMERCIAL

## 2025-07-16 VITALS
TEMPERATURE: 97.2 F | OXYGEN SATURATION: 98 % | SYSTOLIC BLOOD PRESSURE: 122 MMHG | HEART RATE: 62 BPM | WEIGHT: 152 LBS | DIASTOLIC BLOOD PRESSURE: 70 MMHG | HEIGHT: 69 IN | BODY MASS INDEX: 22.51 KG/M2

## 2025-07-16 DIAGNOSIS — Z01.818 PRE-OP EXAMINATION: Primary | ICD-10-CM

## 2025-07-16 DIAGNOSIS — S83.206D TEAR OF MENISCUS OF RIGHT KNEE AS CURRENT INJURY, UNSPECIFIED MENISCUS, UNSPECIFIED TEAR TYPE, SUBSEQUENT ENCOUNTER: ICD-10-CM

## 2025-07-16 PROCEDURE — 93000 ELECTROCARDIOGRAM COMPLETE: CPT | Performed by: PHYSICIAN ASSISTANT

## 2025-07-16 PROCEDURE — 99213 OFFICE O/P EST LOW 20 MIN: CPT | Performed by: PHYSICIAN ASSISTANT

## 2025-07-16 NOTE — PROGRESS NOTES
"PRE-OPERATIVE EXAMINATION  Bryan Cooper  1945    Bryan Cooper is a 79 y.o. male with R torn menisucswho is planning to undergo Right knee meniscal repair under general by Dr. Paul  on 07/24/2025. The procedure is indicated for the following condition: R torn meniscus. Patient has not had complications with anesthesia in the past.    ROS:   Chest pain: no   Shortness of breath: no  Shortness of breath with exertion: no  Orthopnea: no  Dizziness: no  Unexplained weight change: no    PMH:  CAD: no  HTN: yes  CKD: no  DM: no on insulin: no  History of CVA: no    He  reports that he has never smoked. He has never been exposed to tobacco smoke. He has never used smokeless tobacco. He reports that he does not currently use alcohol after a past usage of about 1.0 standard drink of alcohol per week. He reports that he does not use drugs.    /70   Pulse 62   Temp (!) 97.2 °F (36.2 °C)   Ht 5' 9\" (1.753 m)   Wt 68.9 kg (152 lb)   SpO2 98%   BMI 22.45 kg/m²   Physical Exam  Constitutional:       Appearance: Normal appearance.   HENT:      Head: Normocephalic.      Right Ear: External ear normal.      Left Ear: External ear normal.      Nose: Nose normal.      Mouth/Throat:      Mouth: Mucous membranes are moist.      Pharynx: Oropharynx is clear.     Eyes:      Conjunctiva/sclera: Conjunctivae normal.       Cardiovascular:      Rate and Rhythm: Normal rate and regular rhythm.      Heart sounds: Normal heart sounds.   Pulmonary:      Effort: Pulmonary effort is normal.      Breath sounds: Normal breath sounds.   Abdominal:      General: Bowel sounds are normal.      Palpations: Abdomen is soft.     Neurological:      Mental Status: He is alert and oriented to person, place, and time.     Psychiatric:         Behavior: Behavior normal.         Revised Cardiac Risk Index (RCRI) for Pre-Operative Risk   (estimates risk of cardiac complications after noncardiac surgery)    High-risk surgery: No 0 / Yes " +1  Intraperitoneal, intrathoracic, suprainguinal vascular  History of ischemic heart disease: No 0 / Yes +1  Hx of MI, (+) exercise test, current chest pain considered due to myocardial ischemia, use of nitrate therapy or ECG with pathological Q waves)  History of CHF: No 0 / Yes +1  Pulmonary edema, B/L rales or S3 gallop; MARRERO, orthopnea, PND, CXR showing pulmonary vascular redistribution)  History of cerebrovascular disease: No 0 / Yes +1  Prior TIA or stroke  Pre-operative treatment with insulin: No 0 / Yes +1  Pre-operative creatinine >2 mg/dL: No 0 / Yes +1    RCRI Scorin points: Class I Risk, 3.9% 30-day risk of death, MI, or cardiac arrest  1 point: Class II Risk, 6.0% 30-day risk of death, MI, or cardiac arrest  2 points: Class III Risk, 10.1% 30-day risk of death, MI, or cardiac arrest  3 points: Class IV Risk, 15% 30-day risk of death, MI, or cardiac arrest  4 points: Class IV Risk, 15% 30-day risk of death, MI, or cardiac arrest  5 points: Class IV Risk, 15% 30-day risk of death, MI, or cardiac arrest  6 points: Class IV Risk, 15% 30-day risk of death, MI, or cardiac arrest    Lab Results   Component Value Date    CREATININE 0.71 2025       There are no diagnoses linked to this encounter.    Recommendations:  Bryan Cooper is undergoing an elective Low Risk surgery. He is RCRI class I risk (0 points) with 3.9% 30-day risk of death, MI, or cardiac arrest. He may proceed with surgery as planned without further workup.Pre-operative form completed and faxed today to office as requested. Hold all NSAID medications at least 3 days prior to surgery    Discussed with Dr. Victoria , who is in agreement with the plan as outlined above.

## 2025-07-17 NOTE — TELEPHONE ENCOUNTER
I called Patient and informed him that all information was forward to Maria Luisa Jules @ [phone] for the Pre Op clearance .  Patient also forgot a folder in the office.  Patient stated that he will pick it up tomorrow after 10:00 AM.   I was able to inform Patient of the billing.  Patient understood there will be a new bill sent to his home.

## 2025-07-21 ENCOUNTER — TELEPHONE (OUTPATIENT)
Age: 80
End: 2025-07-21

## 2025-07-21 NOTE — TELEPHONE ENCOUNTER
Maria Luisa from [facility] called and is wondering if we can fax over the results from the lab work pt did on 7/12 for his procedure.     Fax: [phone] Attn: Maria Luisa    Thanks.

## 2025-07-22 NOTE — TELEPHONE ENCOUNTER
Forms were faxed on Wednesday July 16th 2025 and confirmed they received the fax.  Also when I spoke to the Patient on Thursday 18,2025 to inform him that he has left some of his information in an envelope here in the office . Patient also stated that he called Maria Luisa and she stated that they were received .  All forms were faxed again on July 22,2025 and confirmed.